# Patient Record
Sex: FEMALE | Race: WHITE | Employment: OTHER | URBAN - METROPOLITAN AREA
[De-identification: names, ages, dates, MRNs, and addresses within clinical notes are randomized per-mention and may not be internally consistent; named-entity substitution may affect disease eponyms.]

---

## 2017-01-05 RX ORDER — TIOTROPIUM BROMIDE 18 UG/1
CAPSULE ORAL; RESPIRATORY (INHALATION)
Qty: 30 CAPSULE | Refills: 2 | Status: SHIPPED | OUTPATIENT
Start: 2017-01-05 | End: 2017-07-17

## 2017-02-03 ENCOUNTER — HOSPITAL ENCOUNTER (INPATIENT)
Facility: HOSPITAL | Age: 82
LOS: 5 days | Discharge: NURSING, REHAB OR LONG-TERM CARE | DRG: 481 | End: 2017-02-09
Attending: EMERGENCY MEDICINE | Admitting: INTERNAL MEDICINE
Payer: MEDICARE

## 2017-02-03 DIAGNOSIS — S72.142A INTERTROCHANTERIC FRACTURE OF LEFT HIP, CLOSED, INITIAL ENCOUNTER (HCC): Primary | ICD-10-CM

## 2017-02-03 LAB
BASOPHILS # BLD AUTO: 0.07 X10(3) UL (ref 0–0.1)
BASOPHILS NFR BLD AUTO: 0.6 %
EOSINOPHIL # BLD AUTO: 0.33 X10(3) UL (ref 0–0.3)
EOSINOPHIL NFR BLD AUTO: 2.6 %
ERYTHROCYTE [DISTWIDTH] IN BLOOD BY AUTOMATED COUNT: 14.6 % (ref 11.5–16)
HCT VFR BLD AUTO: 39.1 % (ref 34–50)
HGB BLD-MCNC: 13.3 G/DL (ref 12–16)
IMMATURE GRANULOCYTE COUNT: 0.04 X10(3) UL (ref 0–1)
IMMATURE GRANULOCYTE RATIO %: 0.3 %
LYMPHOCYTES # BLD AUTO: 1.68 X10(3) UL (ref 0.9–4)
LYMPHOCYTES NFR BLD AUTO: 13.2 %
MCH RBC QN AUTO: 30.3 PG (ref 27–33.2)
MCHC RBC AUTO-ENTMCNC: 34 G/DL (ref 31–37)
MCV RBC AUTO: 89.1 FL (ref 81–100)
MONOCYTES # BLD AUTO: 1.09 X10(3) UL (ref 0.1–0.6)
MONOCYTES NFR BLD AUTO: 8.6 %
NEUTROPHIL ABS PRELIM: 9.5 X10 (3) UL (ref 1.3–6.7)
NEUTROPHILS # BLD AUTO: 9.5 X10(3) UL (ref 1.3–6.7)
NEUTROPHILS NFR BLD AUTO: 74.7 %
PLATELET # BLD AUTO: 271 10(3)UL (ref 150–450)
RBC # BLD AUTO: 4.39 X10(6)UL (ref 3.8–5.1)
RED CELL DISTRIBUTION WIDTH-SD: 47.5 FL (ref 35.1–46.3)
WBC # BLD AUTO: 12.7 X10(3) UL (ref 4–13)

## 2017-02-03 RX ORDER — HYDROMORPHONE HYDROCHLORIDE 1 MG/ML
0.5 INJECTION, SOLUTION INTRAMUSCULAR; INTRAVENOUS; SUBCUTANEOUS EVERY 30 MIN PRN
Status: DISCONTINUED | OUTPATIENT
Start: 2017-02-03 | End: 2017-02-05

## 2017-02-04 ENCOUNTER — APPOINTMENT (OUTPATIENT)
Dept: GENERAL RADIOLOGY | Facility: HOSPITAL | Age: 82
DRG: 481 | End: 2017-02-04
Attending: EMERGENCY MEDICINE
Payer: MEDICARE

## 2017-02-04 ENCOUNTER — ANESTHESIA EVENT (OUTPATIENT)
Dept: SURGERY | Facility: HOSPITAL | Age: 82
DRG: 481 | End: 2017-02-04
Payer: MEDICARE

## 2017-02-04 ENCOUNTER — APPOINTMENT (OUTPATIENT)
Dept: CT IMAGING | Facility: HOSPITAL | Age: 82
DRG: 481 | End: 2017-02-04
Attending: EMERGENCY MEDICINE
Payer: MEDICARE

## 2017-02-04 PROBLEM — R09.02 HYPOXIA: Chronic | Status: ACTIVE | Noted: 2017-02-04

## 2017-02-04 PROBLEM — S72.142A INTERTROCHANTERIC FRACTURE OF LEFT HIP (HCC): Status: ACTIVE | Noted: 2017-02-04

## 2017-02-04 PROBLEM — S72.142A INTERTROCHANTERIC FRACTURE OF LEFT HIP, CLOSED, INITIAL ENCOUNTER (HCC): Status: ACTIVE | Noted: 2017-02-04

## 2017-02-04 LAB
ALBUMIN SERPL-MCNC: 3.4 G/DL (ref 3.5–4.8)
ALLENS TEST: POSITIVE
ALP LIVER SERPL-CCNC: 83 U/L (ref 55–142)
ALT SERPL-CCNC: 24 U/L (ref 14–54)
ANTIBODY SCREEN: NEGATIVE
ARTERIAL BLD GAS O2 SATURATION: 93 % (ref 92–100)
ARTERIAL BLOOD GAS BASE EXCESS: 1.1
ARTERIAL BLOOD GAS HCO3: 26.5 MEQ/L (ref 22–26)
ARTERIAL BLOOD GAS PCO2: 45 MM HG (ref 35–45)
ARTERIAL BLOOD GAS PH: 7.39 (ref 7.35–7.45)
ARTERIAL BLOOD GAS PO2: 67 MM HG (ref 80–105)
AST SERPL-CCNC: 27 U/L (ref 15–41)
ATRIAL RATE: 80 BPM
BASOPHILS # BLD AUTO: 0.07 X10(3) UL (ref 0–0.1)
BASOPHILS NFR BLD AUTO: 0.5 %
BILIRUB SERPL-MCNC: 0.3 MG/DL (ref 0.1–2)
BUN BLD-MCNC: 16 MG/DL (ref 8–20)
BUN BLD-MCNC: 18 MG/DL (ref 8–20)
CALCIUM BLD-MCNC: 8.8 MG/DL (ref 8.3–10.3)
CALCIUM BLD-MCNC: 8.8 MG/DL (ref 8.3–10.3)
CALCULATED O2 SATURATION: 93 % (ref 92–100)
CARBOXYHEMOGLOBIN: 1.5 % SAT (ref 0–3)
CHLORIDE: 97 MMOL/L (ref 101–111)
CHLORIDE: 99 MMOL/L (ref 101–111)
CO2: 26 MMOL/L (ref 22–32)
CO2: 29 MMOL/L (ref 22–32)
CREAT BLD-MCNC: 0.85 MG/DL (ref 0.55–1.02)
CREAT BLD-MCNC: 0.92 MG/DL (ref 0.55–1.02)
EOSINOPHIL # BLD AUTO: 0.02 X10(3) UL (ref 0–0.3)
EOSINOPHIL NFR BLD AUTO: 0.1 %
ERYTHROCYTE [DISTWIDTH] IN BLOOD BY AUTOMATED COUNT: 14.6 % (ref 11.5–16)
GLUCOSE BLD-MCNC: 102 MG/DL (ref 70–99)
GLUCOSE BLD-MCNC: 125 MG/DL (ref 70–99)
HAV IGM SER QL: 2.1 MG/DL (ref 1.7–3)
HCT VFR BLD AUTO: 40.3 % (ref 34–50)
HGB BLD-MCNC: 13.5 G/DL (ref 12–16)
IMMATURE GRANULOCYTE COUNT: 0.06 X10(3) UL (ref 0–1)
IMMATURE GRANULOCYTE RATIO %: 0.4 %
INR BLD: 0.87 (ref 0.89–1.12)
L/M: 4 L/MIN
LYMPHOCYTES # BLD AUTO: 1.03 X10(3) UL (ref 0.9–4)
LYMPHOCYTES NFR BLD AUTO: 7.5 %
M PROTEIN MFR SERPL ELPH: 6.6 G/DL (ref 6.1–8.3)
MCH RBC QN AUTO: 29.9 PG (ref 27–33.2)
MCHC RBC AUTO-ENTMCNC: 33.5 G/DL (ref 31–37)
MCV RBC AUTO: 89.4 FL (ref 81–100)
METHEMOGLOBIN: 0.6 % SAT (ref 0.4–1.5)
MONOCYTES # BLD AUTO: 1.21 X10(3) UL (ref 0.1–0.6)
MONOCYTES NFR BLD AUTO: 8.8 %
NEUTROPHIL ABS PRELIM: 11.36 X10 (3) UL (ref 1.3–6.7)
NEUTROPHILS # BLD AUTO: 11.36 X10(3) UL (ref 1.3–6.7)
NEUTROPHILS NFR BLD AUTO: 82.7 %
P AXIS: 81 DEGREES
P-R INTERVAL: 176 MS
PATIENT TEMPERATURE: 98.6 F
PLATELET # BLD AUTO: 293 10(3)UL (ref 150–450)
POTASSIUM SERPL-SCNC: 4.8 MMOL/L (ref 3.6–5.1)
POTASSIUM SERPL-SCNC: 5.1 MMOL/L (ref 3.6–5.1)
PSA SERPL DL<=0.01 NG/ML-MCNC: 12.1 SECONDS (ref 12.3–14.8)
Q-T INTERVAL: 372 MS
QRS DURATION: 76 MS
QTC CALCULATION (BEZET): 429 MS
R AXIS: 69 DEGREES
RBC # BLD AUTO: 4.51 X10(6)UL (ref 3.8–5.1)
RED CELL DISTRIBUTION WIDTH-SD: 47.7 FL (ref 35.1–46.3)
RH BLOOD TYPE: POSITIVE
SODIUM SERPL-SCNC: 133 MMOL/L (ref 136–144)
SODIUM SERPL-SCNC: 133 MMOL/L (ref 136–144)
T AXIS: 62 DEGREES
TOTAL HEMOGLOBIN: 12.2 G/DL (ref 11.7–16)
TROPONIN: <0.046 NG/ML (ref ?–0.05)
VENTRICULAR RATE: 80 BPM
WBC # BLD AUTO: 13.8 X10(3) UL (ref 4–13)

## 2017-02-04 PROCEDURE — 99223 1ST HOSP IP/OBS HIGH 75: CPT | Performed by: INTERNAL MEDICINE

## 2017-02-04 PROCEDURE — 73502 X-RAY EXAM HIP UNI 2-3 VIEWS: CPT

## 2017-02-04 PROCEDURE — 70450 CT HEAD/BRAIN W/O DYE: CPT

## 2017-02-04 PROCEDURE — 71010 XR CHEST AP PORTABLE  (CPT=71010): CPT

## 2017-02-04 PROCEDURE — 72192 CT PELVIS W/O DYE: CPT

## 2017-02-04 RX ORDER — IPRATROPIUM BROMIDE AND ALBUTEROL SULFATE 2.5; .5 MG/3ML; MG/3ML
3 SOLUTION RESPIRATORY (INHALATION)
Status: DISCONTINUED | OUTPATIENT
Start: 2017-02-04 | End: 2017-02-09

## 2017-02-04 RX ORDER — ENOXAPARIN SODIUM 100 MG/ML
40 INJECTION SUBCUTANEOUS DAILY
Status: DISCONTINUED | OUTPATIENT
Start: 2017-02-04 | End: 2017-02-04

## 2017-02-04 RX ORDER — MONTELUKAST SODIUM 10 MG/1
10 TABLET ORAL NIGHTLY
Status: DISCONTINUED | OUTPATIENT
Start: 2017-02-04 | End: 2017-02-09

## 2017-02-04 RX ORDER — SODIUM CHLORIDE 9 MG/ML
INJECTION, SOLUTION INTRAVENOUS CONTINUOUS
Status: DISCONTINUED | OUTPATIENT
Start: 2017-02-04 | End: 2017-02-08

## 2017-02-04 RX ORDER — HYDROCODONE BITARTRATE AND ACETAMINOPHEN 5; 325 MG/1; MG/1
1 TABLET ORAL EVERY 4 HOURS PRN
Status: DISCONTINUED | OUTPATIENT
Start: 2017-02-04 | End: 2017-02-09

## 2017-02-04 RX ORDER — LOSARTAN POTASSIUM 100 MG/1
100 TABLET ORAL
Status: DISCONTINUED | OUTPATIENT
Start: 2017-02-04 | End: 2017-02-09

## 2017-02-04 RX ORDER — HYDROMORPHONE HYDROCHLORIDE 1 MG/ML
0.4 INJECTION, SOLUTION INTRAMUSCULAR; INTRAVENOUS; SUBCUTANEOUS EVERY 2 HOUR PRN
Status: DISCONTINUED | OUTPATIENT
Start: 2017-02-04 | End: 2017-02-06

## 2017-02-04 RX ORDER — ONDANSETRON 2 MG/ML
INJECTION INTRAMUSCULAR; INTRAVENOUS
Status: COMPLETED
Start: 2017-02-04 | End: 2017-02-04

## 2017-02-04 RX ORDER — DOCUSATE SODIUM 100 MG/1
100 CAPSULE, LIQUID FILLED ORAL 2 TIMES DAILY
Status: DISCONTINUED | OUTPATIENT
Start: 2017-02-04 | End: 2017-02-09

## 2017-02-04 RX ORDER — POLYETHYLENE GLYCOL 3350 17 G/17G
17 POWDER, FOR SOLUTION ORAL DAILY PRN
Status: DISCONTINUED | OUTPATIENT
Start: 2017-02-04 | End: 2017-02-09

## 2017-02-04 RX ORDER — BISACODYL 10 MG
10 SUPPOSITORY, RECTAL RECTAL
Status: DISCONTINUED | OUTPATIENT
Start: 2017-02-04 | End: 2017-02-09

## 2017-02-04 RX ORDER — HYDROMORPHONE HYDROCHLORIDE 1 MG/ML
0.2 INJECTION, SOLUTION INTRAMUSCULAR; INTRAVENOUS; SUBCUTANEOUS EVERY 2 HOUR PRN
Status: DISCONTINUED | OUTPATIENT
Start: 2017-02-04 | End: 2017-02-09

## 2017-02-04 RX ORDER — ENOXAPARIN SODIUM 100 MG/ML
40 INJECTION SUBCUTANEOUS ONCE
Status: COMPLETED | OUTPATIENT
Start: 2017-02-04 | End: 2017-02-04

## 2017-02-04 RX ORDER — SODIUM PHOSPHATE, DIBASIC AND SODIUM PHOSPHATE, MONOBASIC 7; 19 G/133ML; G/133ML
1 ENEMA RECTAL ONCE AS NEEDED
Status: ACTIVE | OUTPATIENT
Start: 2017-02-04 | End: 2017-02-04

## 2017-02-04 RX ORDER — HYDROCODONE BITARTRATE AND ACETAMINOPHEN 5; 325 MG/1; MG/1
2 TABLET ORAL EVERY 4 HOURS PRN
Status: DISCONTINUED | OUTPATIENT
Start: 2017-02-04 | End: 2017-02-09

## 2017-02-04 RX ORDER — METHYLPREDNISOLONE SODIUM SUCCINATE 40 MG/ML
40 INJECTION, POWDER, LYOPHILIZED, FOR SOLUTION INTRAMUSCULAR; INTRAVENOUS EVERY 8 HOURS
Status: DISCONTINUED | OUTPATIENT
Start: 2017-02-04 | End: 2017-02-06

## 2017-02-04 RX ORDER — HYDROMORPHONE HYDROCHLORIDE 1 MG/ML
0.8 INJECTION, SOLUTION INTRAMUSCULAR; INTRAVENOUS; SUBCUTANEOUS EVERY 2 HOUR PRN
Status: DISCONTINUED | OUTPATIENT
Start: 2017-02-04 | End: 2017-02-06

## 2017-02-04 RX ORDER — ACETAMINOPHEN 325 MG/1
650 TABLET ORAL EVERY 4 HOURS PRN
Status: DISCONTINUED | OUTPATIENT
Start: 2017-02-04 | End: 2017-02-09

## 2017-02-04 NOTE — ED PROVIDER NOTES
Patient Seen in: [facility] Emergency Department    History   Patient presents with:  Lower Extremity Injury (musculoskeletal)    Stated Complaint: fall rt hip pain     HPI    44-year-old female fell about 5 hours ago when she got tangled in her she Family History   Problem Relation Age of Onset   • Other[other] [OTHER] Father 79     stroke   • Other[other] [OTHER] Sister      anorexia   • Diabetes Sister    • Heart Disorder Brother 79     AMI   • Cancer Sister      small cell cancer lung         Sm place, and time. She exhibits normal muscle tone. Coordination normal.   Skin: Skin is warm and dry. No rash noted. Psychiatric: She has a normal mood and affect. Her behavior is normal.   Nursing note and vitals reviewed.            ED Course     Labs Re ANTIBODY SCREEN   MRSA/SA SCRN BY PCR:EMERG ORTHO SURG ONLY     CT PELVIS WITHOUT CONTRAST      IMPRESSION:  Nondisplaced though mildly impacted intertrochanteric fracture is noted of the right proximal femur.  No other evidence of acute fracture or dislo encounter diagnosis)    Disposition:  Admit    Follow-up:  No follow-up provider specified.     Medications Prescribed:  Current Discharge Medication List        Present on Admission  Date Reviewed: 1/4/2016          ICD-10-CM Noted POA    Intertrochanteric

## 2017-02-04 NOTE — CONSULTS
[address] [facility] Patient Status:  Inpatient    1929 MRN [id number]   [facility] 3SW-A Attending Liudmila Pantoja DO   Hosp Day # 1 PCP Berenice Pallas, DO   CC:  Right hip pain    History of Prese smokeless tobacco. She reports that she drinks about 8.4 oz of alcohol per week. She reports that she does not use illicit drugs.     Allergies:    Daliresp [Roflumila*    Pain  Scopolamine                 Comment:HBr SOLN             Altered mental state intact skin. Thigh is soft. Knee and ankle are NT. Left lower extremity:  Left hip has intact skin. Thigh is soft. Knee and ankle are NT. Both lower extremities have no calve tenderness. Grossly intact MS exam.  +DP.     Laboratory Data:  Recent Lab

## 2017-02-04 NOTE — PLAN OF CARE
DISCHARGE PLANNING    • Discharge to home or other facility with appropriate resources Progressing        MUSCULOSKELETAL - ADULT    • Return mobility to safest level of function Progressing    • Maintain proper alignment of affected body part Progressing

## 2017-02-04 NOTE — PLAN OF CARE
Spoke with Dr. Amos Patrick regarding pt's order for UA with culture reflux. Pt is incontinent of urine, and has a history of urinary incontinence. Unable to obtain UA with culture reflux per bedpan. UA order cancelled per Dr. Amos Patrick.

## 2017-02-04 NOTE — PHYSICAL THERAPY NOTE
PT consult received. Pt going to OR tomorrow for R hip pinning secondary to fx. Will need new PT orders post-op when appropriate.

## 2017-02-04 NOTE — PLAN OF CARE
Patient remained oriented to person, place, time and situation this shift. Drowsy, but easily arousable. On oxygen 6 liters per ventimask with continuous pulse oximetry monitoring and telemetry monitoring in place.   SCD to LLE, patient refusing SCD to RL

## 2017-02-04 NOTE — H&P
[facility]                                                               History & Physical         Diaz [facility] Patient Status:  Inpatient    1929 MRN [id number]   [facility] 3SW-A Attending Darnell Tejeda MD   [facility] Day reports that she has been smoking Cigarettes. She has been smoking about 0.50 packs per day. She has never used smokeless tobacco. She reports that she drinks about 8.4 oz of alcohol per week. She reports that she does not use illicit drugs.     Allergies: bruits. Respiratory: Clear to auscultation bilaterally. No wheezes. No rhonchi. Cardiovascular: S1, S2.  Regular rate and rhythm. No murmurs. Equal pulses   Abdomen: Soft, nontender, nondistended. Positive bowel sounds.  No rebound tenderness  Neurolog Minimal anterolisthesis of L3 on L4.    Vascular calcifications. Colonic diverticulosis without evidence of diverticulitis. The bladder is unremarkable in appearance. Hysterectomy Osteopenia.      =====  CONCLUSION:     1.  Right intertrochanteric femur fra

## 2017-02-04 NOTE — PROGRESS NOTES
[facility]  Progress Note     Elodia Farooq Patient Status:  Inpatient    1929 MRN [id number]   [facility] 3SW-A Attending Rocio Kincaid, [address] Day # 1 PCP Eve Gupta DO     Chief Complaint: right hip pain    S: ipratropium-albuterol  3 mL Nebulization 4 times per day   • MethylPREDNISolone Sodium Succ  40 mg Intravenous Q8H       ASSESSMENT / PLAN:     1. Right intertrochanteric femoral fracture status post mechanical fall at home  1.  Plan for surgical fixation i

## 2017-02-04 NOTE — OCCUPATIONAL THERAPY NOTE
OT consult received. Pt going to OR tomorrow for R hip pinning secondary to fx. Will need new OT orders post-op when appropriate.

## 2017-02-04 NOTE — CONSULTS
[facility]  Report of Consultation    Ramy Mtz Patient Status:  Inpatient    1929 MRN [id number]   [facility] 3SW-A Attending Ramon Diamond, [address] [facility] Day # 1 PCP Arnoldo Mendez DO     Reason for Consultation:  David Prado smoking about 0.50 packs per day. She has never used smokeless tobacco. She reports that she drinks about 8.4 oz of alcohol per week. She reports that she does not use illicit drugs.     Allergies:    Daliresp [Roflumila*    Pain  Scopolamine nausea vomiting or diarrhea denies any reflux  Musculoskeletal: Denies any lower extremity edema or pain other than related to the fall  Neurological: Mostly intact though some confusion at times     All other review of systems are negative.     Vital signs 02/04/2017   ALB 3.4 02/03/2017   ALKPHO 83 02/03/2017   BILT 0.3 02/03/2017   TP 6.6 02/03/2017   AST 27 02/03/2017   ALT 24 02/03/2017   INR 0.87 02/03/2017   PTP 12.1 02/03/2017   MG 2.1 02/04/2017   TROP <0.046 02/03/2017         Radiology:  Head CT wi

## 2017-02-04 NOTE — PROGRESS NOTES
Please see the anesthesia preanesthetic evaluation. Patient presents for repair of right intertrochanteric femur fracture. Anesthesia risks are elevated related to the patient's COPD requiring home oxygen.    Will consider spinal anesthesia for this proce

## 2017-02-04 NOTE — CM/SW NOTE
02/04/17 1700   CM/SW Referral Data   Referral Source Physician   Reason for Referral Discharge planning   Informant Patient   Pertinent Medical Hx   Primary Care Physician Name dr cheryl Hager Drug/Alcohol Use n   Major Kamala

## 2017-02-04 NOTE — PLAN OF CARE
Patient admitted to 370 via cart from emergency department. Accompanied by transporter. Patient is drowsy, easily arousable, oriented to person, place, time and situation. Oriented to room and safety protocols.   Patient states she is blind due to macula

## 2017-02-04 NOTE — ANESTHESIA PREPROCEDURE EVALUATION
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 ASA: 4   Plan: general and spinal           Comment: Discussed with daughter Zee Alexy present. Patient can sign consent, though asked daughter to sign because patient is legally blind.    Patient's overwhelming anesthesia challenge will b

## 2017-02-05 ENCOUNTER — ANESTHESIA (OUTPATIENT)
Dept: SURGERY | Facility: HOSPITAL | Age: 82
DRG: 481 | End: 2017-02-05
Payer: MEDICARE

## 2017-02-05 ENCOUNTER — APPOINTMENT (OUTPATIENT)
Dept: GENERAL RADIOLOGY | Facility: HOSPITAL | Age: 82
DRG: 481 | End: 2017-02-05
Attending: ORTHOPAEDIC SURGERY
Payer: MEDICARE

## 2017-02-05 ENCOUNTER — APPOINTMENT (OUTPATIENT)
Dept: GENERAL RADIOLOGY | Facility: HOSPITAL | Age: 82
DRG: 481 | End: 2017-02-05
Attending: INTERNAL MEDICINE
Payer: MEDICARE

## 2017-02-05 ENCOUNTER — SURGERY (OUTPATIENT)
Age: 82
End: 2017-02-05

## 2017-02-05 LAB
BASOPHILS # BLD AUTO: 0.01 X10(3) UL (ref 0–0.1)
BASOPHILS NFR BLD AUTO: 0.1 %
BUN BLD-MCNC: 17 MG/DL (ref 8–20)
CALCIUM BLD-MCNC: 8 MG/DL (ref 8.3–10.3)
CHLORIDE: 104 MMOL/L (ref 101–111)
CO2: 26 MMOL/L (ref 22–32)
CREAT BLD-MCNC: 0.7 MG/DL (ref 0.55–1.02)
EOSINOPHIL # BLD AUTO: 0 X10(3) UL (ref 0–0.3)
EOSINOPHIL NFR BLD AUTO: 0 %
ERYTHROCYTE [DISTWIDTH] IN BLOOD BY AUTOMATED COUNT: 14.7 % (ref 11.5–16)
GLUCOSE BLD-MCNC: 123 MG/DL (ref 65–99)
GLUCOSE BLD-MCNC: 154 MG/DL (ref 70–99)
HAV IGM SER QL: 2.2 MG/DL (ref 1.7–3)
HCT VFR BLD AUTO: 36 % (ref 34–50)
HGB BLD-MCNC: 11.8 G/DL (ref 12–16)
IMMATURE GRANULOCYTE COUNT: 0.02 X10(3) UL (ref 0–1)
IMMATURE GRANULOCYTE RATIO %: 0.3 %
LYMPHOCYTES # BLD AUTO: 0.36 X10(3) UL (ref 0.9–4)
LYMPHOCYTES NFR BLD AUTO: 4.8 %
MCH RBC QN AUTO: 29.9 PG (ref 27–33.2)
MCHC RBC AUTO-ENTMCNC: 32.8 G/DL (ref 31–37)
MCV RBC AUTO: 91.1 FL (ref 81–100)
MONOCYTES # BLD AUTO: 0.28 X10(3) UL (ref 0.1–0.6)
MONOCYTES NFR BLD AUTO: 3.8 %
NEUTROPHIL ABS PRELIM: 6.77 X10 (3) UL (ref 1.3–6.7)
NEUTROPHILS # BLD AUTO: 6.77 X10(3) UL (ref 1.3–6.7)
NEUTROPHILS NFR BLD AUTO: 91 %
PHOSPHATE SERPL-MCNC: 2.5 MG/DL (ref 2.5–4.9)
PLATELET # BLD AUTO: 228 10(3)UL (ref 150–450)
POTASSIUM SERPL-SCNC: 4.5 MMOL/L (ref 3.6–5.1)
RBC # BLD AUTO: 3.95 X10(6)UL (ref 3.8–5.1)
RED CELL DISTRIBUTION WIDTH-SD: 49.4 FL (ref 35.1–46.3)
SODIUM SERPL-SCNC: 136 MMOL/L (ref 136–144)
WBC # BLD AUTO: 7.4 X10(3) UL (ref 4–13)

## 2017-02-05 PROCEDURE — 73552 X-RAY EXAM OF FEMUR 2/>: CPT

## 2017-02-05 PROCEDURE — 99232 SBSQ HOSP IP/OBS MODERATE 35: CPT | Performed by: HOSPITALIST

## 2017-02-05 PROCEDURE — 0QS606Z REPOSITION RIGHT UPPER FEMUR WITH INTRAMEDULLARY INTERNAL FIXATION DEVICE, OPEN APPROACH: ICD-10-PCS | Performed by: ORTHOPAEDIC SURGERY

## 2017-02-05 PROCEDURE — 3E0T3CZ INTRODUCTION OF REGIONAL ANESTHETIC INTO PERIPHERAL NERVES AND PLEXI, PERCUTANEOUS APPROACH: ICD-10-PCS | Performed by: ANESTHESIOLOGY

## 2017-02-05 PROCEDURE — 71010 XR CHEST AP PORTABLE  (CPT=71010): CPT

## 2017-02-05 PROCEDURE — 76000 FLUOROSCOPY <1 HR PHYS/QHP: CPT

## 2017-02-05 DEVICE — ZNN CMN LAG SCREW 10.5X95
Type: IMPLANTABLE DEVICE | Site: FEMUR | Status: FUNCTIONAL
Brand: ZIMMER® NATURAL NAIL® SYSTEM

## 2017-02-05 DEVICE — ZNN CMN NAIL 10MMX21.5CM 125R
Type: IMPLANTABLE DEVICE | Site: FEMUR | Status: FUNCTIONAL
Brand: ZIMMER® NATURAL NAIL® SYSTEM

## 2017-02-05 DEVICE — SCREW CORT FA 5.0X35 Z NAIL: Type: IMPLANTABLE DEVICE | Site: FEMUR | Status: FUNCTIONAL

## 2017-02-05 RX ORDER — HYDROMORPHONE HYDROCHLORIDE 1 MG/ML
0.4 INJECTION, SOLUTION INTRAMUSCULAR; INTRAVENOUS; SUBCUTANEOUS EVERY 5 MIN PRN
Status: DISCONTINUED | OUTPATIENT
Start: 2017-02-05 | End: 2017-02-05 | Stop reason: HOSPADM

## 2017-02-05 RX ORDER — DOCUSATE SODIUM 100 MG/1
100 CAPSULE, LIQUID FILLED ORAL 2 TIMES DAILY
Status: DISCONTINUED | OUTPATIENT
Start: 2017-02-05 | End: 2017-02-05

## 2017-02-05 RX ORDER — ENOXAPARIN SODIUM 100 MG/ML
40 INJECTION SUBCUTANEOUS DAILY
Status: DISCONTINUED | OUTPATIENT
Start: 2017-02-06 | End: 2017-02-06

## 2017-02-05 RX ORDER — HYDROCODONE BITARTRATE AND ACETAMINOPHEN 10; 325 MG/1; MG/1
2 TABLET ORAL AS NEEDED
Status: DISCONTINUED | OUTPATIENT
Start: 2017-02-05 | End: 2017-02-05 | Stop reason: HOSPADM

## 2017-02-05 RX ORDER — FAMOTIDINE 20 MG/1
20 TABLET ORAL DAILY
Status: DISCONTINUED | OUTPATIENT
Start: 2017-02-05 | End: 2017-02-09

## 2017-02-05 RX ORDER — SODIUM CHLORIDE, SODIUM LACTATE, POTASSIUM CHLORIDE, CALCIUM CHLORIDE 600; 310; 30; 20 MG/100ML; MG/100ML; MG/100ML; MG/100ML
INJECTION, SOLUTION INTRAVENOUS CONTINUOUS
Status: DISCONTINUED | OUTPATIENT
Start: 2017-02-05 | End: 2017-02-06

## 2017-02-05 RX ORDER — ACETAMINOPHEN 325 MG/1
650 TABLET ORAL ONCE
Status: DISCONTINUED | OUTPATIENT
Start: 2017-02-05 | End: 2017-02-05 | Stop reason: HOSPADM

## 2017-02-05 RX ORDER — MORPHINE SULFATE 2 MG/ML
2 INJECTION, SOLUTION INTRAMUSCULAR; INTRAVENOUS EVERY 2 HOUR PRN
Status: DISCONTINUED | OUTPATIENT
Start: 2017-02-05 | End: 2017-02-09

## 2017-02-05 RX ORDER — ACETAMINOPHEN 500 MG
1000 TABLET ORAL ONCE AS NEEDED
Status: DISCONTINUED | OUTPATIENT
Start: 2017-02-05 | End: 2017-02-05 | Stop reason: HOSPADM

## 2017-02-05 RX ORDER — POLYETHYLENE GLYCOL 3350 17 G/17G
1 POWDER, FOR SOLUTION ORAL DAILY PRN
Status: DISCONTINUED | OUTPATIENT
Start: 2017-02-05 | End: 2017-02-09

## 2017-02-05 RX ORDER — ONDANSETRON 2 MG/ML
4 INJECTION INTRAMUSCULAR; INTRAVENOUS AS NEEDED
Status: DISCONTINUED | OUTPATIENT
Start: 2017-02-05 | End: 2017-02-05 | Stop reason: HOSPADM

## 2017-02-05 RX ORDER — HYDROCODONE BITARTRATE AND ACETAMINOPHEN 10; 325 MG/1; MG/1
1 TABLET ORAL AS NEEDED
Status: DISCONTINUED | OUTPATIENT
Start: 2017-02-05 | End: 2017-02-05 | Stop reason: HOSPADM

## 2017-02-05 RX ORDER — DEXTROSE MONOHYDRATE 25 G/50ML
50 INJECTION, SOLUTION INTRAVENOUS
Status: DISCONTINUED | OUTPATIENT
Start: 2017-02-05 | End: 2017-02-05 | Stop reason: HOSPADM

## 2017-02-05 RX ORDER — ONDANSETRON 2 MG/ML
4 INJECTION INTRAMUSCULAR; INTRAVENOUS EVERY 6 HOURS PRN
Status: DISCONTINUED | OUTPATIENT
Start: 2017-02-05 | End: 2017-02-09

## 2017-02-05 RX ORDER — CEFAZOLIN SODIUM 1 G/3ML
INJECTION, POWDER, FOR SOLUTION INTRAMUSCULAR; INTRAVENOUS
Status: DISCONTINUED | OUTPATIENT
Start: 2017-02-05 | End: 2017-02-05 | Stop reason: HOSPADM

## 2017-02-05 RX ORDER — BISACODYL 10 MG
10 SUPPOSITORY, RECTAL RECTAL
Status: DISCONTINUED | OUTPATIENT
Start: 2017-02-05 | End: 2017-02-09

## 2017-02-05 RX ORDER — TEMAZEPAM 15 MG/1
15 CAPSULE ORAL NIGHTLY PRN
Status: DISCONTINUED | OUTPATIENT
Start: 2017-02-05 | End: 2017-02-06

## 2017-02-05 RX ORDER — NALOXONE HYDROCHLORIDE 0.4 MG/ML
80 INJECTION, SOLUTION INTRAMUSCULAR; INTRAVENOUS; SUBCUTANEOUS AS NEEDED
Status: DISCONTINUED | OUTPATIENT
Start: 2017-02-05 | End: 2017-02-05 | Stop reason: HOSPADM

## 2017-02-05 RX ORDER — FAMOTIDINE 10 MG/ML
20 INJECTION, SOLUTION INTRAVENOUS DAILY
Status: DISCONTINUED | OUTPATIENT
Start: 2017-02-05 | End: 2017-02-06

## 2017-02-05 NOTE — PLAN OF CARE
This RN spoke with Dr. Tracee Iverson regarding patient and Dr. Tracee Iverson stated it was ok to send Taniya Harris to surgery this am.  Anesthesia to determine if patient needs to go to ICU for respiratory status post operatively.

## 2017-02-05 NOTE — PROGRESS NOTES
[facility]  Progress Note    Jonathan Lucio Patient Status:  Inpatient    1929 MRN [id number]   [facility] 3SW-A Attending Adrien Watson DO   Hosp Day # 2 PCP Ayden Quintanilla DO     Subjective:  Jonathan Lucio is a(n) 80 hypertension     Seborrheic dermatitis of scalp     Pulmonary hypertension (HCC)     Intertrochanteric fracture of left hip (HCC)     Intertrochanteric fracture of left hip, closed, initial encounter     Hypoxia     Hyponatremia    Impression    1.  Right

## 2017-02-05 NOTE — ANESTHESIA POSTPROCEDURE EVALUATION
[address] [facility] Patient Status:  Inpatient   Age/Gender 80year old female MRN [id number]   [facility] SURGERY Attending Leesa Mcnair, [address] [facility] Day # 2 PCP Mj Morgan DO       Anesthesia Post-op Note    Procedure(s

## 2017-02-05 NOTE — PROGRESS NOTES
[facility] Pharmacy Note:  Renal Dose Adjustment    Quan Mendieta has been prescribed famotidine (PEPCID) 20 mg intravenously /po every 12 hours. Estimated Creatinine Clearance: 40.6 mL/min (based on Cr of 0.7).     Her calculated creatinine clearance is <5

## 2017-02-05 NOTE — OPERATIVE REPORT
PRE-OP DX:  RIGHT INTERTROCHANTERIC HIP FRACTURE  POST-OP DX:  SAME  PROCEDURE:RGHT FEMUR INTRAMEDULLARY FIXATION  SURGEON:  Aarti Nickerson MD  FIRST ASSIST: NONE  ANESTHESIA:  SPINAL  FEM:20BA  COMPLICATIONS:  NONE  SPECIMEN:  NONE  DRAIN: NONE   IMPLANT USE SUBCUTANEOUS LAYER WAS CLOSED. SKIN WAS CLOSED WITH STAPLES. DRESSING WAS APPLIED. ALL COUNTS WERE CORRECT.     Yuliya Quiroz MD

## 2017-02-05 NOTE — PROGRESS NOTES
[facility]ITAL[facility]  Progress Note     Enrique Lezama Patient Status:  Inpatient    1929 MRN [id number]   [facility] 3SW-A Attending Lazarus Handy, [address] [facility] Day # 2 PCP Serena Bucio DO     Chief Complaint: right hip pain    S: • famoTIDine  20 mg Intravenous Daily   • ceFAZolin  2 g Intravenous Q8H   • Fluticasone Furoate-Vilanterol  1 puff Inhalation Daily   • losartan  100 mg Oral Daily   • Montelukast Sodium  10 mg Oral Nightly   • Umeclidinium Bromide  1 puff Inhalation Da

## 2017-02-05 NOTE — PLAN OF CARE
Patient remained alert and oriented to person, place, time and situation this shift. On oxygen 4-5 liters per high pressure nasal cannula with continuous pulse oximetry monitoring and oxygen saturations remaining greater than 92%.   Patient is blind but ab

## 2017-02-06 LAB
ERYTHROCYTE [DISTWIDTH] IN BLOOD BY AUTOMATED COUNT: 15.1 % (ref 11.5–16)
HCT VFR BLD AUTO: 31.9 % (ref 34–50)
HGB BLD-MCNC: 10.5 G/DL (ref 12–16)
MCH RBC QN AUTO: 30.3 PG (ref 27–33.2)
MCHC RBC AUTO-ENTMCNC: 32.9 G/DL (ref 31–37)
MCV RBC AUTO: 92.2 FL (ref 81–100)
PLATELET # BLD AUTO: 225 10(3)UL (ref 150–450)
RBC # BLD AUTO: 3.46 X10(6)UL (ref 3.8–5.1)
RED CELL DISTRIBUTION WIDTH-SD: 51.4 FL (ref 35.1–46.3)
WBC # BLD AUTO: 12 X10(3) UL (ref 4–13)

## 2017-02-06 PROCEDURE — 99232 SBSQ HOSP IP/OBS MODERATE 35: CPT | Performed by: HOSPITALIST

## 2017-02-06 RX ORDER — METHYLPREDNISOLONE SODIUM SUCCINATE 40 MG/ML
40 INJECTION, POWDER, LYOPHILIZED, FOR SOLUTION INTRAMUSCULAR; INTRAVENOUS EVERY 8 HOURS
Status: COMPLETED | OUTPATIENT
Start: 2017-02-06 | End: 2017-02-06

## 2017-02-06 RX ORDER — PREDNISONE 20 MG/1
40 TABLET ORAL
Status: DISCONTINUED | OUTPATIENT
Start: 2017-02-07 | End: 2017-02-09

## 2017-02-06 NOTE — PROGRESS NOTES
Orthopedic surgery progress note    Kulwantherminia Bautista Patient Status:  Inpatient    1929 MRN [id number]   [facility] 3SW-A Attending Leesa Mcnair, 160[address] [facility] Day # 3 PCP Mj Morgan DO       Subjective:  No major complaints.   No

## 2017-02-06 NOTE — PROGRESS NOTES
[facility]ITAL[facility]  Progress Note     Heather Peña Patient Status:  Inpatient    1929 MRN [id number]   [facility] 3SW-A Attending Yesi Kang, [address] [facility] Day # 3 PCP Carlo Gee DO     Chief Complaint: right hip pain    S: Q8H   • [START ON 2/7/2017] predniSONE  40 mg Oral Daily with breakfast   • enoxaparin  40 mg Subcutaneous Daily   • famoTIDine  20 mg Oral Daily    Or   • famoTIDine  20 mg Intravenous Daily   • Fluticasone Furoate-Vilanterol  1 puff Inhalation Daily   •

## 2017-02-06 NOTE — CM/SW NOTE
Informed by PT/OT that NAMAN is recommended. Pt indicating earlier that she wanted to go home but now is aware of need for NAMAN. MELANIE met with pt and son. Discussed NAMAN list and encouraged son to tour. MELANIE noted [facility] partnered SARs.   Pt/son to call MELANIE with q

## 2017-02-06 NOTE — PHYSICAL THERAPY NOTE
PHYSICAL THERAPY EVALUATION - INPATIENT     Room Number: 370/370-A  Evaluation Date: 2/6/2017  Type of Evaluation: Initial  Physician Order: PT Eval and Treat    Presenting Problem: Intertrochantaric hip fracture  Reason for Therapy: Mobility Dysfuncti risk    WEIGHT BEARING RESTRICTION  Weight Bearing Restriction: None                PAIN ASSESSMENT  Ratin          COGNITION  · Following Commands:  follows one step commands consistently  · Motor Planning: intact  · Problem Solving:  assistance requi motivated. Patient was easily aroused and required cueing to remain in supine while bed and lines were adjusted for patient's safety. Patient completed bed mobility with CGA and completed sit to stand with MOD-A.  Patient was able to stand using a rolling conservation;Patient education; Family education;Gait training;Strengthening;Transfer training;Balance training  Rehab Potential : Good  Frequency (Obs): 5x/week  Number of Visits to Meet Established Goals: 5        CURRENT GOALS    Goal #1 Patient is able

## 2017-02-06 NOTE — PROGRESS NOTES
Post Op Day 1 Ortho Note    Status Post Nerve Block:  Type of Nerve Block: Right Fascia Iliaca  Single Injection Nerve Block    Post op review: No evidence of immediate block related complications, No paresthesia noted, Able to plantar flex foot, Able to d

## 2017-02-06 NOTE — OCCUPATIONAL THERAPY NOTE
OCCUPATIONAL THERAPY EVALUATION - INPATIENT     Room Number: 370/370-A  Evaluation Date: 2/6/2017  Type of Evaluation: Initial  Presenting Problem: fall, R intertrochenteric femur fracture, 2/5 s/p R femur intramedullary fixation    Physician Order: G[place]: modified independent with ADL, including showering. Pt's sons work during the day. They cook, drive, and shop. Pt has been able to ambulate without device at home.       SUBJECTIVE  \"I am strong, but I notice that my legs are trembling now Impairment: 66.57%  Standardized Score (AM-PAC Scale): 30.6  CMS Modifier (G-Code): CL    FUNCTIONAL TRANSFER ASSESSMENT  Supine to Sit : Maximum assistance  Sit to Stand: Dependent assistance (max A x 2)    Skilled Therapy Provided: Pt is highly motivated inpatient OT to address the above deficits, maximizing patient's ability to return to prior level of function. Subacute rehab is recommended for 20 to 22 days. After this period of rehabilitation patient should achieve supervision level in ADL.  Informed

## 2017-02-06 NOTE — PROGRESS NOTES
[facility]  Progress Note    Manda Schwab Patient Status:  Inpatient    1929 MRN [id number]   [facility] 3SW-A Attending Nazario Wall DO   Hosp Day # 3 PCP Donald Rashid DO     Impression    1.  Right anterior trocha distress  Heart: Regular rate and rhythm, normal S1S2  Lungs:  diminsine d   Abdomen: soft, nontender, no guarding, no rebound, positive BS  Extremity: no lower extremity edema, no cyanosis  Neurological:, no new focal deficits    Lab Data Review:    Recen infusion  Intravenous Continuous   Fluticasone Furoate-Vilanterol (BREO ELLIPTA) 200-25 MCG/INH inhaler 1 puff 1 puff Inhalation Daily   losartan (COZAAR) tab 100 mg 100 mg Oral Daily   Montelukast Sodium (SINGULAIR) tab 10 mg 10 mg Oral Nightly   Umeclidi

## 2017-02-06 NOTE — PROGRESS NOTES
Patient sitting upright in bed, O2 high flow in place, non productive cough, IVF infusing, awaiting PT evalution.  Per patient she is reluctant to consider Stefan, would consider 8 hour nursing care at home, has 3 adult family members who work and them would b

## 2017-02-06 NOTE — PLAN OF CARE
Patient remained alert and oriented to person, place, time and situation this shift. On oxygen 4-5 liters per nasal cannula with continuous pulse oximetry monitoring and oxygen saturations remaining greater than 92% this shift.   Administered ancef as orde

## 2017-02-07 LAB
ERYTHROCYTE [DISTWIDTH] IN BLOOD BY AUTOMATED COUNT: 15 % (ref 11.5–16)
HCT VFR BLD AUTO: 33.1 % (ref 34–50)
HGB BLD-MCNC: 11 G/DL (ref 12–16)
MCH RBC QN AUTO: 30.2 PG (ref 27–33.2)
MCHC RBC AUTO-ENTMCNC: 33.2 G/DL (ref 31–37)
MCV RBC AUTO: 90.9 FL (ref 81–100)
PLATELET # BLD AUTO: 247 10(3)UL (ref 150–450)
RBC # BLD AUTO: 3.64 X10(6)UL (ref 3.8–5.1)
RED CELL DISTRIBUTION WIDTH-SD: 50.3 FL (ref 35.1–46.3)
WBC # BLD AUTO: 10.3 X10(3) UL (ref 4–13)

## 2017-02-07 PROCEDURE — 99232 SBSQ HOSP IP/OBS MODERATE 35: CPT | Performed by: HOSPITALIST

## 2017-02-07 RX ORDER — HYDROCODONE BITARTRATE AND ACETAMINOPHEN 5; 325 MG/1; MG/1
1 TABLET ORAL EVERY 4 HOURS PRN
Qty: 60 TABLET | Refills: 0 | Status: ON HOLD | OUTPATIENT
Start: 2017-02-07 | End: 2017-09-05

## 2017-02-07 NOTE — PLAN OF CARE
DISCHARGE PLANNING    • Discharge to home or other facility with appropriate resources Progressing        Impaired Activities of Daily Living    • Achieve highest/safest level of independence in self care Progressing        Impaired Functional Mobility

## 2017-02-07 NOTE — PHYSICAL THERAPY NOTE
PHYSICAL THERAPY HIP TREATMENT NOTE - INPATIENT      Room Number: 370/370-A     Session: 1   Number of Visits to Meet Established Goals: 5    Presenting Problem: Intertrochantaric hip fracture    Problem List  Principal Problem:    Intertrochanteric fractu bed?: A Little   How much help from another person does the patient currently need. ..   -   Moving to and from a bed to a chair (including a wheelchair)?: A Little   -   Need to walk in hospital room?: A Little   -   Climbing 3-5 steps with a railing?: A L gait/transfers resulting in downgrade of overall functional mobility. Due to above deficits, Pt will benefit from continued IP PT, so that patient may achieve highest functional independence/return to baseline.  Recommend NAMAN upon EH d/c.        DISCHARGE

## 2017-02-07 NOTE — PROGRESS NOTES
[facility]PITAL[facility]  Progress Note     Adolphpavel Mtz Patient Status:  Inpatient    1929 MRN [id number]   [facility] 3SW-A Attending Ramon Diamond, [address] [facility] Day # 4 PCP Arnoldo Mendez DO     Chief Complaint: right hip pain    S: ipratropium-albuterol  3 mL Nebulization 4 times per day       ASSESSMENT / PLAN:     1. Right intertrochanteric femoral fracture secondary to mechanical fall s/p intramedullary honey  1. Ortho following  2. PT following  3. Continue pain control  2.  COPD wi

## 2017-02-07 NOTE — CM/SW NOTE
MELANIE spoke with dtr, Marjorie Sue, again re: NAMAN referrals. She requests referral to [facility] [address] at [facility], [facility]. as well. MELANIE noted that [facility] reviewing and does have bed availability.   Shahram Trace- message left for Jolene Martini in admission (dtr spoke with Flo), and P

## 2017-02-07 NOTE — PROGRESS NOTES
[facility]  Progress Note    Suresh العراقي Patient Status:  Inpatient    1929 MRN [id number]   [facility] 3SW-A Attending Mervat Prabhakar DO   Hosp Day # 4 PCP Benita Christiansen DO     Impression    1.  Right anterior trocha kg)  01/04/16 : 111 lb (50.349 kg)  11/19/15 : 110 lb (49.896 kg)  08/20/15 : 109 lb 14.4 oz (49.85 kg)  05/07/15 : 112 lb (50.803 kg)      Physical Exam:  General: alert, oriented, no apparent distress  Heart: Regular rate and rhythm, normal S1S2  Lungs: Bromide (INCRUSE ELLIPTA) 62.5 MCG/INH inhaler 1 puff 1 puff Inhalation Daily   0.9%  NaCl infusion  Intravenous Continuous   acetaminophen (TYLENOL) tab 650 mg 650 mg Oral Q4H PRN   Or      HYDROcodone-acetaminophen (NORCO) 5-325 MG per tab 1 tablet 1 tab

## 2017-02-07 NOTE — OCCUPATIONAL THERAPY NOTE
OCCUPATIONAL THERAPY TREATMENT NOTE - INPATIENT     Room Number: 370/370-A  Session: 1   Number of Visits to Meet Established Goals: 5    Presenting Problem: fall, R intertrochenteric femur fracture, 2/5 s/p R femur intramedullary fixation    History relat Inpatient Daily Activity Short Form  How much help from another person does the patient currently need…  -   Putting on and taking off regular lower body clothing?: A Lot  -   Bathing (including washing, rinsing, drying)?: A Lot  -   Toileting, which inclu would benefit from skilled inpatient OT to address the above deficits, maximizing patient's ability to return to prior level of function. Subacute rehab is recommended for 20 to 22 days.   After this period of rehabilitation patient should achieve supervis

## 2017-02-07 NOTE — CM/SW NOTE
SW received call from pt's dtr, Doris Ortega [phone]. She notes that she or her sister, Cosme Riojas, are the main contacts. She adds that they have 6 brothers.   Dtr states that they have 3 SARs that are being considered- [employer], [address]

## 2017-02-07 NOTE — PLAN OF CARE
Found pt attempting to get out of bed , disoriented to time, situation and place . After trying to explain the situation , pt agreed to get back in bed with minimal assist . Will continue to monitor .

## 2017-02-08 LAB
ERYTHROCYTE [DISTWIDTH] IN BLOOD BY AUTOMATED COUNT: 14.6 % (ref 11.5–16)
HCT VFR BLD AUTO: 32.1 % (ref 34–50)
HGB BLD-MCNC: 10.7 G/DL (ref 12–16)
MCH RBC QN AUTO: 29.8 PG (ref 27–33.2)
MCHC RBC AUTO-ENTMCNC: 33.3 G/DL (ref 31–37)
MCV RBC AUTO: 89.4 FL (ref 81–100)
PLATELET # BLD AUTO: 269 10(3)UL (ref 150–450)
RBC # BLD AUTO: 3.59 X10(6)UL (ref 3.8–5.1)
RED CELL DISTRIBUTION WIDTH-SD: 47.9 FL (ref 35.1–46.3)
WBC # BLD AUTO: 8.9 X10(3) UL (ref 4–13)

## 2017-02-08 PROCEDURE — 99232 SBSQ HOSP IP/OBS MODERATE 35: CPT | Performed by: HOSPITALIST

## 2017-02-08 NOTE — PHYSICAL THERAPY NOTE
PHYSICAL THERAPY HIP TREATMENT NOTE - INPATIENT      Room Number: 370/370-A     Session:  2  Number of Visits to Meet Established Goals: 5    Presenting Problem: Intertrochantaric hip fracture    Problem List  Principal Problem:    Intertrochanteric fractu commode, etc.): A Lot   -   Moving from lying on back to sitting on the side of the bed?: A Little   How much help from another person does the patient currently need. ..   -   Moving to and from a bed to a chair (including a wheelchair)?: A Little   -   Ne to advance gait distance, and motivated by session progress. At this time, Pt. presents with decreased balance, impaired strength, difficulty with gait/transfers resulting in downgrade of overall functional mobility.   Due to above deficits, Pt will benefi

## 2017-02-08 NOTE — PROGRESS NOTES
[facility]  Progress Note     Ceasar Divide Patient Status:  Inpatient    1929 MRN [id number]   [facility] 3SW-A Attending James Hernandez MD   [facility] Day # 5 PCP Dale Munson DO     Chief Complaint: right hip pain    S: times per day       ASSESSMENT / PLAN:     1. Right intertrochanteric femoral fracture secondary to mechanical fall s/p intramedullary honey  1. Ortho following  2. PT following  3. Continue pain control  2.  Acute COPD exacerbation with chronic hypoxic respi

## 2017-02-08 NOTE — PROGRESS NOTES
Called and spoke with pulmonology APN this afternoon.  Does not see any reason that patient cannot be released to rehab if bed becomes available, however, recommends waiting for final decision by pulmonologist when rounding today prior to making transfer  a

## 2017-02-08 NOTE — CM/SW NOTE
Spoke with daughter Alton Moreland via phone, currently touring [facility] facilities, no final decision as of yet. Instructed to call SW/CM as soon as a facility is chosen as patient may be ready for discharge today or tomorrow.     Flaquito Villalobos RN,   [phone]

## 2017-02-08 NOTE — CM/SW NOTE
TC from patient's daughter Jose Tanner. They have chosen The [address] [place] [address] at [facility]. ECIN checked and referral in place. Tentative d/c planned for tomorrow.

## 2017-02-08 NOTE — PROGRESS NOTES
[facility]  Progress Note    Loraine Napoles Patient Status:  Inpatient    1929 MRN [id number]   [facility] 3SW-A Attending Mya Santos MD   [address] Day # 5 PCP Mona Wick DO     Subjective:    1. Findings suggestive of emphysema/COPD. 2. Prior suggested right upper lobe pulmonary nodule is not discretely seen on the current study. 3. No acute disease is discretely noted.     Medications Reviewed:    Current Facility-Administered Medications: ipratropium-albuterol (DUONEB) nebulizer solution 3 mL 3 mL Nebulization 4 times per day       Assessment and Plan:  Patient Active Problem List:     COPD [facility]chronic [facility] disease) [facility]     Legally blind     DM (diabetes mellitus) type II

## 2017-02-08 NOTE — OCCUPATIONAL THERAPY NOTE
OCCUPATIONAL THERAPY TREATMENT NOTE - INPATIENT     Room Number: 370/370-A  Session: 2   Number of Visits to Meet Established Goals: 5    Presenting Problem: fall, R intertrochenteric femur fracture, 2/5 s/p R femur intramedullary fixation    History relat Inpatient Daily Activity Short Form  How much help from another person does the patient currently need…  -   Putting on and taking off regular lower body clothing?: A Little  -   Bathing (including washing, rinsing, drying)?: A Lot  -   Toileting, which in degeneration. The patient is below baseline and would benefit from skilled inpatient OT to address the above deficits, maximizing patient's ability to return to prior level of function. Subacute rehab is recommended for 20 to 22 days.   After this period o

## 2017-02-09 VITALS
TEMPERATURE: 98 F | RESPIRATION RATE: 18 BRPM | HEIGHT: 60 IN | SYSTOLIC BLOOD PRESSURE: 118 MMHG | OXYGEN SATURATION: 94 % | WEIGHT: 100 LBS | DIASTOLIC BLOOD PRESSURE: 56 MMHG | BODY MASS INDEX: 19.63 KG/M2 | HEART RATE: 68 BPM

## 2017-02-09 PROCEDURE — 99239 HOSP IP/OBS DSCHRG MGMT >30: CPT | Performed by: HOSPITALIST

## 2017-02-09 RX ORDER — PREDNISONE 10 MG/1
TABLET ORAL
Qty: 20 TABLET | Refills: 0 | Status: ON HOLD | OUTPATIENT
Start: 2017-02-09 | End: 2017-09-05 | Stop reason: CLARIF

## 2017-02-09 NOTE — PHYSICAL THERAPY NOTE
PHYSICAL THERAPY TREATMENT NOTE - INPATIENT    Room Number: 370/370-A     Session: 3   Number of Visits to Meet Established Goals: 5    Presenting Problem: Intertrochantaric hip fracture    Problem List  Principal Problem:    Intertrochanteric fracture of in bed (including adjusting bedclothes, sheets and blankets)?: A Little   -   Sitting down on and standing up from a chair with arms (e.g., wheelchair, bedside commode, etc.): A Little   -   Moving from lying on back to sitting on the side of the bed?: A L impaired strength, difficulty with gait/transfers resulting in downgrade of overall functional mobility.  Due to above deficits, Pt will benefit from continued IP PT, so that patient may achieve highest functional independence/return to baseline.  Recommend

## 2017-02-09 NOTE — PLAN OF CARE
DISCHARGE PLANNING    • Discharge to home or other facility with appropriate resources Adequate for Discharge        Impaired Activities of Daily Living    • Achieve highest/safest level of independence in self care Adequate for Discharge        Impaired F

## 2017-02-09 NOTE — OCCUPATIONAL THERAPY NOTE
Attempted to see the patient for OT session. Pt is waiting for transport to [facility].

## 2017-02-09 NOTE — PROGRESS NOTES
[facility]  Progress Note    Yamileth Awad Patient Status:  Inpatient    1929 MRN [id number]   [facility] 3SW-A Attending Pilar Wong MD   [address] Day # 6 PCP Raford Cogan, DO     Subjective:  Yamileth Awad is a(n) 80 Intertrochanteric fracture of left hip (HCC)     Intertrochanteric fracture of left hip, closed, initial encounter     Hypoxia     Hyponatremia    Assessment:      · Right Intertrochanteric Fem Fx 2/2 Mechanical Fall s/p Intramedullary Barrie  · O2 Dependent

## 2017-02-09 NOTE — CM/SW NOTE
02/09/17 1700   Discharge disposition   Discharged to: Skilled Nurs   Name of [address] [place]   Patient assessed for rehabilitation services?  Yes   Patient is Discharged to a [address] [place], [place] [address]vard Yes   Discharge transportatio

## 2017-02-10 ENCOUNTER — SNF ADMIT/H&P (OUTPATIENT)
Dept: INTERNAL MEDICINE CLINIC | Facility: CLINIC | Age: 82
End: 2017-02-10

## 2017-02-10 DIAGNOSIS — S72.142A INTERTROCHANTERIC FRACTURE OF LEFT HIP, CLOSED, INITIAL ENCOUNTER (HCC): Primary | ICD-10-CM

## 2017-02-10 DIAGNOSIS — J44.9 COPD, SEVERE (HCC): ICD-10-CM

## 2017-02-10 DIAGNOSIS — I10 ESSENTIAL HYPERTENSION: ICD-10-CM

## 2017-02-10 DIAGNOSIS — C85.90 LYMPHOMA IN REMISSION (HCC): ICD-10-CM

## 2017-02-10 PROCEDURE — 99306 1ST NF CARE HIGH MDM 50: CPT | Performed by: INTERNAL MEDICINE

## 2017-02-10 NOTE — PROGRESS NOTES
Patient was discharged from [facility] to the [facility] yesterday. The patient was admitted with a fall with a intertrochanteric fracture of the left hip. She ultimately underwent surgery on February 5 per Dr. Spencer Luz   For her physical therapy notes from bowel sounds  Extremities: No cyanosis, she has trace edema noted in the operative leg. Peripheral pulses were palpable. Neuro: Grossly nonfocal and intact    Impression: 1. Right hip fracture, 2. COPD, 3. Hypertension, 4.   Tobacco abuse    Plan: Confidex

## 2017-02-10 NOTE — DISCHARGE SUMMARY
[facility]PITAL[facility]  DISCHARGE SUMMARY     Yamileth Awad Patient Status:  Inpatient    1929 MRN [id number]   [facility] 3SW-A Attending No att. providers found   Hosp Day # 6 PCP Raford Cogan, DO     Date of Admission: 2/3/2017 focal weakness or numbness. Brief Synopsis:     The patient was diagnosed with a right intertrochanteric femoral fracture. This is secondary to mechanical fall. She was seen by orthopedic surgery. On 2/5/2017 she had a intertrochanteric nail placed. rinsing as needed    Quantity:  118 mL   Refills:  1       DULERA 100-5 MCG/ACT Aero   Generic drug:  Mometasone Furo-Formoterol Fum        Inhale 1 puff into the lungs 2 (two) times daily.     Refills:  9       losartan 100 MG Tabs   Last time this was giv for a visit in 2 weeks  orthopedics    MD Kalyan Yusuf [address] [facility] Jordontaradha [phone]      Pulmonologistkatherine after dc rehab      Vital signs:  Temp:  [98 °F (36.7 °C)-98.5 °F (36.9 °C)] 98 °F (36.7 °C)  Pulse:  [76-18

## 2017-02-13 ENCOUNTER — SNF VISIT (OUTPATIENT)
Dept: INTERNAL MEDICINE CLINIC | Facility: CLINIC | Age: 82
End: 2017-02-13

## 2017-02-13 DIAGNOSIS — S72.001D CLOSED RIGHT HIP FRACTURE, WITH ROUTINE HEALING, SUBSEQUENT ENCOUNTER: Primary | ICD-10-CM

## 2017-02-13 DIAGNOSIS — I10 ESSENTIAL HYPERTENSION: ICD-10-CM

## 2017-02-13 DIAGNOSIS — C85.90 LYMPHOMA IN REMISSION (HCC): ICD-10-CM

## 2017-02-13 DIAGNOSIS — J44.9 COPD, SEVERE (HCC): ICD-10-CM

## 2017-02-13 PROCEDURE — 99307 SBSQ NF CARE SF MDM 10: CPT | Performed by: INTERNAL MEDICINE

## 2017-02-13 NOTE — PROGRESS NOTES
Patient was seen in follow-up for her recent right hip fracture, COPD, hypertension, and lymphoma in remission. She was in her room without any complaints of chest pain, shortness of breath, nausea or vomiting.   She is tolerating the nicotine patch at 14

## 2017-02-15 ENCOUNTER — SNF VISIT (OUTPATIENT)
Dept: INTERNAL MEDICINE CLINIC | Facility: CLINIC | Age: 82
End: 2017-02-15

## 2017-02-15 DIAGNOSIS — I10 ESSENTIAL HYPERTENSION: ICD-10-CM

## 2017-02-15 DIAGNOSIS — S72.001D CLOSED RIGHT HIP FRACTURE, WITH ROUTINE HEALING, SUBSEQUENT ENCOUNTER: Primary | ICD-10-CM

## 2017-02-15 DIAGNOSIS — J44.9 COPD, SEVERE (HCC): ICD-10-CM

## 2017-02-15 PROCEDURE — 99307 SBSQ NF CARE SF MDM 10: CPT | Performed by: INTERNAL MEDICINE

## 2017-02-20 ENCOUNTER — SNF VISIT (OUTPATIENT)
Dept: INTERNAL MEDICINE CLINIC | Facility: CLINIC | Age: 82
End: 2017-02-20

## 2017-02-20 DIAGNOSIS — I10 ESSENTIAL HYPERTENSION: ICD-10-CM

## 2017-02-20 DIAGNOSIS — R50.9 FEVER, UNSPECIFIED FEVER CAUSE: Primary | ICD-10-CM

## 2017-02-20 DIAGNOSIS — J44.9 COPD, SEVERE (HCC): ICD-10-CM

## 2017-02-20 DIAGNOSIS — S72.001D CLOSED RIGHT HIP FRACTURE, WITH ROUTINE HEALING, SUBSEQUENT ENCOUNTER: ICD-10-CM

## 2017-02-20 PROCEDURE — 99308 SBSQ NF CARE LOW MDM 20: CPT | Performed by: INTERNAL MEDICINE

## 2017-02-20 NOTE — PROGRESS NOTES
Patient was seen today in follow-up for her recent right hip fracture, COPD and hypertension. According to nursing the patient is running a low-grade fever and she has crackles in her left lung base.   Patient was currently sitting in a room in her wheelch

## 2017-02-20 NOTE — PROGRESS NOTES
Patient was seen in follow-up for her recent hip fracture, hypertension, and COPD. She denied any chest pain, shortness of breath, nausea or vomiting. No difficulty with her bowels or bladder. She has a tentative discharge date set at February 21.     Ob

## 2017-03-01 ENCOUNTER — TELEPHONE (OUTPATIENT)
Dept: FAMILY MEDICINE CLINIC | Facility: CLINIC | Age: 82
End: 2017-03-01

## 2017-03-01 NOTE — TELEPHONE ENCOUNTER
A \"Physician Verbal Order\" and \"Home Health Certification and Plan of Care,\" which were signed by Michell Villaseñor PA-C and cosigned by Vince Vick D.O., were successfully faxed.

## 2017-03-06 ENCOUNTER — TELEPHONE (OUTPATIENT)
Dept: FAMILY MEDICINE CLINIC | Facility: CLINIC | Age: 82
End: 2017-03-06

## 2017-03-06 NOTE — TELEPHONE ENCOUNTER
On 03/03/2017, the patient's \"Physician Verbal Order\" which was signed by Nghia Turpin PA-C and cosigned by Elena Vincent D.O., was successfully faxed.

## 2017-03-09 ENCOUNTER — TELEPHONE (OUTPATIENT)
Dept: FAMILY MEDICINE CLINIC | Facility: CLINIC | Age: 82
End: 2017-03-09

## 2017-03-15 ENCOUNTER — TELEPHONE (OUTPATIENT)
Dept: FAMILY MEDICINE CLINIC | Facility: CLINIC | Age: 82
End: 2017-03-15

## 2017-03-15 NOTE — TELEPHONE ENCOUNTER
Arpita Ramos from [facility] called and said she is going to add more visits on because of edema and left hip surgery.   She will follow th pt until she is done with PT.

## 2017-04-03 RX ORDER — LOSARTAN POTASSIUM 100 MG/1
TABLET ORAL
Qty: 30 TABLET | Refills: 0 | Status: SHIPPED | OUTPATIENT
Start: 2017-04-03 | End: 2017-05-30

## 2017-04-03 NOTE — TELEPHONE ENCOUNTER
Losartan approved qty 30 NR  Patient is due for a 6 month f/u  Please call to schedule appt-will need for further refills  [phone] (home)

## 2017-04-03 NOTE — TELEPHONE ENCOUNTER
Pt has been informed refill has been approved but will need appointment for future refills. Pt is going to call us back to schedule once she knows her transportation.

## 2017-04-24 RX ORDER — CLOBETASOL PROPIONATE 0.05 G/100ML
SHAMPOO TOPICAL
Qty: 118 ML | Refills: 1 | Status: SHIPPED | OUTPATIENT
Start: 2017-04-24 | End: 2018-01-01 | Stop reason: ALTCHOICE

## 2017-05-30 RX ORDER — LOSARTAN POTASSIUM 100 MG/1
TABLET ORAL
Qty: 30 TABLET | Refills: 0 | Status: SHIPPED | OUTPATIENT
Start: 2017-05-30 | End: 2017-06-29

## 2017-06-29 RX ORDER — LOSARTAN POTASSIUM 100 MG/1
TABLET ORAL
Qty: 30 TABLET | Refills: 0 | Status: SHIPPED | OUTPATIENT
Start: 2017-06-29 | End: 2017-07-27

## 2017-06-29 NOTE — TELEPHONE ENCOUNTER
Losartan approved qty 30 NR  Patient past due for an ov  Please call to schedule appt-will need for any further refills  [phone] (home)

## 2017-06-30 NOTE — TELEPHONE ENCOUNTER
Srinath Avila understood she needs a appt with Fani Sofia for further refills, she will call the office after she sees the pulmonologist.

## 2017-07-05 ENCOUNTER — HOSPITAL ENCOUNTER (OUTPATIENT)
Dept: CT IMAGING | Facility: HOSPITAL | Age: 82
Discharge: HOME | End: 2017-07-05
Attending: INTERNAL MEDICINE
Payer: MEDICARE

## 2017-07-05 DIAGNOSIS — C85.90 LYMPHOMA (HCC): ICD-10-CM

## 2017-07-05 PROCEDURE — 71260 CT THORAX DX C+: CPT | Performed by: INTERNAL MEDICINE

## 2017-07-05 PROCEDURE — 74177 CT ABD & PELVIS W/CONTRAST: CPT | Performed by: INTERNAL MEDICINE

## 2017-07-18 RX ORDER — TIOTROPIUM BROMIDE 18 UG/1
CAPSULE ORAL; RESPIRATORY (INHALATION)
Qty: 30 CAPSULE | Refills: 0 | Status: SHIPPED | OUTPATIENT
Start: 2017-07-18 | End: 2017-08-01

## 2017-07-18 NOTE — TELEPHONE ENCOUNTER
spiriva approved for qty 30 NR  Patient is past due for ov.   Please call to schedule appt-will need for any further refills  [phone] (home)

## 2017-07-24 PROBLEM — R91.1 PULMONARY NODULE, LEFT: Status: ACTIVE | Noted: 2017-07-12

## 2017-07-27 RX ORDER — LOSARTAN POTASSIUM 100 MG/1
TABLET ORAL
Qty: 30 TABLET | Refills: 0 | Status: SHIPPED | OUTPATIENT
Start: 2017-07-27 | End: 2017-08-24

## 2017-07-27 NOTE — TELEPHONE ENCOUNTER
Losartan approved qty 30 NR  Future Appointments  Date Time Provider Howard De La Torre   8/1/2017 9:30 AM Narciso Ibarra PA-C [facility] [place]

## 2017-08-01 ENCOUNTER — OFFICE VISIT (OUTPATIENT)
Dept: FAMILY MEDICINE CLINIC | Facility: CLINIC | Age: 82
End: 2017-08-01

## 2017-08-01 ENCOUNTER — LAB ENCOUNTER (OUTPATIENT)
Dept: LAB | Age: 82
End: 2017-08-01
Attending: FAMILY MEDICINE
Payer: MEDICARE

## 2017-08-01 VITALS
OXYGEN SATURATION: 97 % | BODY MASS INDEX: 20.62 KG/M2 | SYSTOLIC BLOOD PRESSURE: 110 MMHG | HEART RATE: 70 BPM | WEIGHT: 105 LBS | DIASTOLIC BLOOD PRESSURE: 60 MMHG | HEIGHT: 60 IN | RESPIRATION RATE: 16 BRPM

## 2017-08-01 DIAGNOSIS — I10 ESSENTIAL HYPERTENSION: ICD-10-CM

## 2017-08-01 DIAGNOSIS — IMO0001 UNCONTROLLED TYPE 2 DIABETES MELLITUS WITHOUT COMPLICATION, WITHOUT LONG-TERM CURRENT USE OF INSULIN: ICD-10-CM

## 2017-08-01 DIAGNOSIS — E55.9 VITAMIN D DEFICIENCY: ICD-10-CM

## 2017-08-01 DIAGNOSIS — J44.9 COPD, SEVERE (HCC): Primary | ICD-10-CM

## 2017-08-01 DIAGNOSIS — Z79.899 MEDICATION MANAGEMENT: ICD-10-CM

## 2017-08-01 DIAGNOSIS — E11.9 CONTROLLED TYPE 2 DIABETES MELLITUS WITHOUT COMPLICATION, WITHOUT LONG-TERM CURRENT USE OF INSULIN (HCC): ICD-10-CM

## 2017-08-01 DIAGNOSIS — C85.90 LYMPHOMA IN REMISSION (HCC): ICD-10-CM

## 2017-08-01 DIAGNOSIS — M81.0 OSTEOPOROSIS, SENILE: ICD-10-CM

## 2017-08-01 LAB
25-HYDROXYVITAMIN D (TOTAL): 31.6 NG/ML (ref 30–100)
ALBUMIN SERPL-MCNC: 3.7 G/DL (ref 3.5–4.8)
ALP LIVER SERPL-CCNC: 62 U/L (ref 55–142)
ALT SERPL-CCNC: 19 U/L (ref 14–54)
AST SERPL-CCNC: 17 U/L (ref 15–41)
BASOPHILS # BLD AUTO: 0.05 X10(3) UL (ref 0–0.1)
BASOPHILS NFR BLD AUTO: 0.6 %
BILIRUB SERPL-MCNC: 0.3 MG/DL (ref 0.1–2)
BUN BLD-MCNC: 16 MG/DL (ref 8–20)
CALCIUM BLD-MCNC: 9.5 MG/DL (ref 8.3–10.3)
CHLORIDE: 97 MMOL/L (ref 101–111)
CO2: 29 MMOL/L (ref 22–32)
CREAT BLD-MCNC: 0.8 MG/DL (ref 0.55–1.02)
EOSINOPHIL # BLD AUTO: 0.26 X10(3) UL (ref 0–0.3)
EOSINOPHIL NFR BLD AUTO: 3.1 %
ERYTHROCYTE [DISTWIDTH] IN BLOOD BY AUTOMATED COUNT: 14.8 % (ref 11.5–16)
FREE T4: 1.1 NG/DL (ref 0.9–1.8)
GLUCOSE BLD-MCNC: 92 MG/DL (ref 70–99)
HCT VFR BLD AUTO: 38.1 % (ref 34–50)
HGB BLD-MCNC: 12.4 G/DL (ref 12–16)
IMMATURE GRANULOCYTE COUNT: 0.03 X10(3) UL (ref 0–1)
IMMATURE GRANULOCYTE RATIO %: 0.4 %
LYMPHOCYTES # BLD AUTO: 1.58 X10(3) UL (ref 0.9–4)
LYMPHOCYTES NFR BLD AUTO: 18.6 %
M PROTEIN MFR SERPL ELPH: 6.7 G/DL (ref 6.1–8.3)
MCH RBC QN AUTO: 27.9 PG (ref 27–33.2)
MCHC RBC AUTO-ENTMCNC: 32.5 G/DL (ref 31–37)
MCV RBC AUTO: 85.8 FL (ref 81–100)
MONOCYTES # BLD AUTO: 0.74 X10(3) UL (ref 0.1–0.6)
MONOCYTES NFR BLD AUTO: 8.7 %
NEUTROPHIL ABS PRELIM: 5.83 X10 (3) UL (ref 1.3–6.7)
NEUTROPHILS # BLD AUTO: 5.83 X10(3) UL (ref 1.3–6.7)
NEUTROPHILS NFR BLD AUTO: 68.6 %
PLATELET # BLD AUTO: 297 10(3)UL (ref 150–450)
POTASSIUM SERPL-SCNC: 4.4 MMOL/L (ref 3.6–5.1)
RBC # BLD AUTO: 4.44 X10(6)UL (ref 3.8–5.1)
RED CELL DISTRIBUTION WIDTH-SD: 46.6 FL (ref 35.1–46.3)
SODIUM SERPL-SCNC: 132 MMOL/L (ref 136–144)
TSI SER-ACNC: 0.66 MIU/ML (ref 0.35–5.5)
WBC # BLD AUTO: 8.5 X10(3) UL (ref 4–13)

## 2017-08-01 PROCEDURE — 82306 VITAMIN D 25 HYDROXY: CPT

## 2017-08-01 PROCEDURE — 80053 COMPREHEN METABOLIC PANEL: CPT

## 2017-08-01 PROCEDURE — 84443 ASSAY THYROID STIM HORMONE: CPT

## 2017-08-01 PROCEDURE — 99214 OFFICE O/P EST MOD 30 MIN: CPT | Performed by: FAMILY MEDICINE

## 2017-08-01 PROCEDURE — 83036 HEMOGLOBIN GLYCOSYLATED A1C: CPT

## 2017-08-01 PROCEDURE — 85025 COMPLETE CBC W/AUTO DIFF WBC: CPT

## 2017-08-01 PROCEDURE — 84439 ASSAY OF FREE THYROXINE: CPT

## 2017-08-01 PROCEDURE — 36415 COLL VENOUS BLD VENIPUNCTURE: CPT

## 2017-08-01 RX ORDER — MOMETASONE FUROATE AND FORMOTEROL FUMARATE DIHYDRATE 200; 5 UG/1; UG/1
AEROSOL RESPIRATORY (INHALATION)
Refills: 3 | COMMUNITY
Start: 2017-07-03 | End: 2018-01-01

## 2017-08-01 NOTE — PATIENT INSTRUCTIONS
RECLAST infusion for osteoporosis instead of the Actonel.     Will let you know if the kidney function is good enough for the Reclast.

## 2017-08-02 PROBLEM — R09.02 HYPOXIA: Chronic | Status: RESOLVED | Noted: 2017-02-04 | Resolved: 2017-08-02

## 2017-08-02 LAB
EST. AVERAGE GLUCOSE BLD GHB EST-MCNC: 117 MG/DL (ref 68–126)
HBA1C MFR BLD HPLC: 5.7 % (ref ?–5.7)

## 2017-08-02 NOTE — PROGRESS NOTES
Improved Vitamin D take maintenance of dose of 1,000 iu vitamin D OTC. Rest of labs are essentially normal including thyroid, blood counts for anemia and kidney and liver function. Clearance equals 43.82 patient can proceed on with Reclast infusion.   Rec

## 2017-08-03 ENCOUNTER — TELEPHONE (OUTPATIENT)
Dept: FAMILY MEDICINE CLINIC | Facility: CLINIC | Age: 82
End: 2017-08-03

## 2017-08-03 NOTE — TELEPHONE ENCOUNTER
----- Message from Klever Medeiros PA-C sent at 8/2/2017  8:43 PM CDT -----  Hemoglobin A1c is 5.7 patient does not need to follow-up for diabetes we will see her in 6 months, since she is not on any medication and original diabetes was diagnosed while s

## 2017-08-03 NOTE — PROGRESS NOTES
Hemoglobin A1c is 5.7 patient does not need to follow-up for diabetes we will see her in 6 months, since she is not on any medication and original diabetes was diagnosed while she was on prednisone.   Patient has been glucose intolerance since being off pre

## 2017-08-09 ENCOUNTER — TELEPHONE (OUTPATIENT)
Dept: HEMATOLOGY/ONCOLOGY | Facility: HOSPITAL | Age: 82
End: 2017-08-09

## 2017-08-09 NOTE — TELEPHONE ENCOUNTER
Pt will call back when she is able to schedule she does not have a ride at this time and unable to schedule at this time and I called back and left the contact info on her answering machine to call us back when she is able to schedule an appt.

## 2017-08-24 RX ORDER — LOSARTAN POTASSIUM 100 MG/1
TABLET ORAL
Qty: 90 TABLET | Refills: 1 | Status: SHIPPED | OUTPATIENT
Start: 2017-08-24 | End: 2018-01-01

## 2017-09-04 ENCOUNTER — APPOINTMENT (OUTPATIENT)
Dept: GENERAL RADIOLOGY | Facility: HOSPITAL | Age: 82
End: 2017-09-04
Attending: EMERGENCY MEDICINE
Payer: MEDICARE

## 2017-09-04 ENCOUNTER — HOSPITAL ENCOUNTER (OUTPATIENT)
Facility: HOSPITAL | Age: 82
Setting detail: OBSERVATION
Discharge: HOME | End: 2017-09-05
Attending: EMERGENCY MEDICINE | Admitting: INTERNAL MEDICINE
Payer: MEDICARE

## 2017-09-04 ENCOUNTER — APPOINTMENT (OUTPATIENT)
Dept: CT IMAGING | Facility: HOSPITAL | Age: 82
End: 2017-09-04
Attending: EMERGENCY MEDICINE
Payer: MEDICARE

## 2017-09-04 DIAGNOSIS — S20.211A CONTUSION OF RIGHT CHEST WALL, INITIAL ENCOUNTER: ICD-10-CM

## 2017-09-04 DIAGNOSIS — N39.0 URINARY TRACT INFECTION WITHOUT HEMATURIA, SITE UNSPECIFIED: ICD-10-CM

## 2017-09-04 DIAGNOSIS — S20.221A CONTUSION OF RIGHT SIDE OF BACK, INITIAL ENCOUNTER: Primary | ICD-10-CM

## 2017-09-04 LAB
ALBUMIN SERPL-MCNC: 4 G/DL (ref 3.5–4.8)
ALP LIVER SERPL-CCNC: 63 U/L (ref 55–142)
ALT SERPL-CCNC: 19 U/L (ref 14–54)
APTT PPP: 24.4 SECONDS (ref 25–34)
AST SERPL-CCNC: 14 U/L (ref 15–41)
BASOPHILS # BLD AUTO: 0.05 X10(3) UL (ref 0–0.1)
BASOPHILS NFR BLD AUTO: 0.5 %
BILIRUB SERPL-MCNC: 0.3 MG/DL (ref 0.1–2)
BILIRUB UR QL STRIP.AUTO: NEGATIVE
BUN BLD-MCNC: 20 MG/DL (ref 8–20)
CALCIUM BLD-MCNC: 9.4 MG/DL (ref 8.3–10.3)
CHLORIDE: 97 MMOL/L (ref 101–111)
CO2: 30 MMOL/L (ref 22–32)
COLOR UR AUTO: YELLOW
CREAT BLD-MCNC: 0.81 MG/DL (ref 0.55–1.02)
EOSINOPHIL # BLD AUTO: 0.14 X10(3) UL (ref 0–0.3)
EOSINOPHIL NFR BLD AUTO: 1.3 %
ERYTHROCYTE [DISTWIDTH] IN BLOOD BY AUTOMATED COUNT: 14.6 % (ref 11.5–16)
GLUCOSE BLD-MCNC: 120 MG/DL (ref 70–99)
GLUCOSE UR STRIP.AUTO-MCNC: NEGATIVE MG/DL
HCT VFR BLD AUTO: 39.3 % (ref 34–50)
HGB BLD-MCNC: 13.1 G/DL (ref 12–16)
IMMATURE GRANULOCYTE COUNT: 0.05 X10(3) UL (ref 0–1)
IMMATURE GRANULOCYTE RATIO %: 0.5 %
INR BLD: 1.05 (ref 0.89–1.11)
LYMPHOCYTES # BLD AUTO: 1.46 X10(3) UL (ref 0.9–4)
LYMPHOCYTES NFR BLD AUTO: 13.6 %
M PROTEIN MFR SERPL ELPH: 6.8 G/DL (ref 6.1–8.3)
MCH RBC QN AUTO: 28.8 PG (ref 27–33.2)
MCHC RBC AUTO-ENTMCNC: 33.3 G/DL (ref 31–37)
MCV RBC AUTO: 86.4 FL (ref 81–100)
MONOCYTES # BLD AUTO: 0.94 X10(3) UL (ref 0.1–0.6)
MONOCYTES NFR BLD AUTO: 8.7 %
NEUTROPHIL ABS PRELIM: 8.13 X10 (3) UL (ref 1.3–6.7)
NEUTROPHILS # BLD AUTO: 8.13 X10(3) UL (ref 1.3–6.7)
NEUTROPHILS NFR BLD AUTO: 75.4 %
NITRITE UR QL STRIP.AUTO: POSITIVE
PH UR STRIP.AUTO: 5 [PH] (ref 4.5–8)
PLATELET # BLD AUTO: 264 10(3)UL (ref 150–450)
POTASSIUM SERPL-SCNC: 4.7 MMOL/L (ref 3.6–5.1)
PROT UR STRIP.AUTO-MCNC: NEGATIVE MG/DL
PSA SERPL DL<=0.01 NG/ML-MCNC: 13.7 SECONDS (ref 12–14.3)
RBC # BLD AUTO: 4.55 X10(6)UL (ref 3.8–5.1)
RBC UR QL AUTO: NEGATIVE
RED CELL DISTRIBUTION WIDTH-SD: 46.4 FL (ref 35.1–46.3)
SODIUM SERPL-SCNC: 134 MMOL/L (ref 136–144)
SP GR UR STRIP.AUTO: 1.02 (ref 1–1.03)
TROPONIN: <0.046 NG/ML (ref ?–0.05)
UROBILINOGEN UR STRIP.AUTO-MCNC: <2 MG/DL
WBC # BLD AUTO: 10.8 X10(3) UL (ref 4–13)

## 2017-09-04 PROCEDURE — 71101 X-RAY EXAM UNILAT RIBS/CHEST: CPT | Performed by: EMERGENCY MEDICINE

## 2017-09-04 PROCEDURE — 70450 CT HEAD/BRAIN W/O DYE: CPT | Performed by: EMERGENCY MEDICINE

## 2017-09-04 PROCEDURE — 72110 X-RAY EXAM L-2 SPINE 4/>VWS: CPT | Performed by: EMERGENCY MEDICINE

## 2017-09-04 PROCEDURE — 99219 INITIAL OBSERVATION CARE,LEVL II: CPT | Performed by: INTERNAL MEDICINE

## 2017-09-04 PROCEDURE — 73502 X-RAY EXAM HIP UNI 2-3 VIEWS: CPT | Performed by: EMERGENCY MEDICINE

## 2017-09-04 RX ORDER — ACETAMINOPHEN 325 MG/1
650 TABLET ORAL EVERY 6 HOURS PRN
Status: DISCONTINUED | OUTPATIENT
Start: 2017-09-04 | End: 2017-09-05

## 2017-09-04 RX ORDER — DOCUSATE SODIUM 100 MG/1
100 CAPSULE, LIQUID FILLED ORAL 2 TIMES DAILY
Status: DISCONTINUED | OUTPATIENT
Start: 2017-09-05 | End: 2017-09-05

## 2017-09-04 RX ORDER — HYDROMORPHONE HYDROCHLORIDE 1 MG/ML
0.5 INJECTION, SOLUTION INTRAMUSCULAR; INTRAVENOUS; SUBCUTANEOUS EVERY 30 MIN PRN
Status: DISCONTINUED | OUTPATIENT
Start: 2017-09-04 | End: 2017-09-05

## 2017-09-04 RX ORDER — ONDANSETRON 2 MG/ML
4 INJECTION INTRAMUSCULAR; INTRAVENOUS EVERY 4 HOURS PRN
Status: DISCONTINUED | OUTPATIENT
Start: 2017-09-04 | End: 2017-09-05

## 2017-09-04 RX ORDER — ONDANSETRON 2 MG/ML
4 INJECTION INTRAMUSCULAR; INTRAVENOUS EVERY 6 HOURS PRN
Status: DISCONTINUED | OUTPATIENT
Start: 2017-09-04 | End: 2017-09-05

## 2017-09-04 RX ORDER — BISACODYL 10 MG
10 SUPPOSITORY, RECTAL RECTAL
Status: DISCONTINUED | OUTPATIENT
Start: 2017-09-04 | End: 2017-09-05

## 2017-09-04 RX ORDER — POLYETHYLENE GLYCOL 3350 17 G/17G
17 POWDER, FOR SOLUTION ORAL DAILY PRN
Status: DISCONTINUED | OUTPATIENT
Start: 2017-09-04 | End: 2017-09-05

## 2017-09-04 RX ORDER — MORPHINE SULFATE 4 MG/ML
1 INJECTION, SOLUTION INTRAMUSCULAR; INTRAVENOUS EVERY 2 HOUR PRN
Status: DISCONTINUED | OUTPATIENT
Start: 2017-09-04 | End: 2017-09-05

## 2017-09-04 RX ORDER — ACETAMINOPHEN 500 MG
1000 TABLET ORAL ONCE
Status: COMPLETED | OUTPATIENT
Start: 2017-09-04 | End: 2017-09-04

## 2017-09-04 RX ORDER — HYDROCODONE BITARTRATE AND ACETAMINOPHEN 10; 325 MG/1; MG/1
1 TABLET ORAL EVERY 4 HOURS PRN
Status: DISCONTINUED | OUTPATIENT
Start: 2017-09-04 | End: 2017-09-05

## 2017-09-04 RX ORDER — MORPHINE SULFATE 4 MG/ML
2 INJECTION, SOLUTION INTRAMUSCULAR; INTRAVENOUS EVERY 2 HOUR PRN
Status: DISCONTINUED | OUTPATIENT
Start: 2017-09-04 | End: 2017-09-05

## 2017-09-04 RX ORDER — SODIUM PHOSPHATE, DIBASIC AND SODIUM PHOSPHATE, MONOBASIC 7; 19 G/133ML; G/133ML
1 ENEMA RECTAL ONCE AS NEEDED
Status: DISCONTINUED | OUTPATIENT
Start: 2017-09-04 | End: 2017-09-05

## 2017-09-04 RX ORDER — SULFAMETHOXAZOLE AND TRIMETHOPRIM 800; 160 MG/1; MG/1
1 TABLET ORAL ONCE
Status: COMPLETED | OUTPATIENT
Start: 2017-09-04 | End: 2017-09-04

## 2017-09-04 RX ORDER — SODIUM CHLORIDE 9 MG/ML
INJECTION, SOLUTION INTRAVENOUS ONCE
Status: COMPLETED | OUTPATIENT
Start: 2017-09-04 | End: 2017-09-04

## 2017-09-04 RX ORDER — MORPHINE SULFATE 4 MG/ML
4 INJECTION, SOLUTION INTRAMUSCULAR; INTRAVENOUS EVERY 2 HOUR PRN
Status: DISCONTINUED | OUTPATIENT
Start: 2017-09-04 | End: 2017-09-05

## 2017-09-04 NOTE — ED INITIAL ASSESSMENT (HPI)
Pt states she tripped and fell on her right side about 3 hrs ago, initially refused medical treatment. Pt was able to stand up and walk after fall. Later started having right hip and rib pain. No obvious deformity noted. Pt denies LOC.

## 2017-09-04 NOTE — ED PROVIDER NOTES
Patient Seen in: [facility] Emergency Department    History   Patient presents with:  Fall (musculoskeletal, neurologic)    Stated Complaint: fall    HPI    Patient had a fall. She was on some steps in the back of her house and fell.   She states th a thin film to the affected areas only, and left in place for 15 minutes before lathering and rinsing as needed   predniSONE 10 MG Oral Tab,  3 p.o. every morning for 3 days then 2 p.o. every morning for 3 days then 1 p.o. every morning for 5 days   HYDROc °C) (Temporal)   Resp 17   Ht 152.4 cm (5')   Wt 45.4 kg   SpO2 99%   BMI 19.53 kg/m²         Physical Exam    General: The patient is awake, alert, conversant. Patient answers questions quickly and appropriately.   Scalp without large hematoma  Eyes: scle Ketones Urine Trace (*)     Nitrite Urine Positive (*)     Leukocyte Esterase Urine Trace (*)     WBC Urine 5-10 (*)     Squamous Epi.  Cells Moderate (*)     Mucous Urine 1+ (*)     All other components within normal limits   CBC W/ DIFFERENTIAL - Abnor antibiotic  Troponin negative    X-ray right ribs  No acute bony injury to the right ribs.  No acute airspace disease    X-ray right hip  No evidence of hardware loosening or new fracture    X-ray lumbar spine  Stable chronic severe degenerative changes of

## 2017-09-05 VITALS
SYSTOLIC BLOOD PRESSURE: 137 MMHG | HEART RATE: 66 BPM | HEIGHT: 60 IN | TEMPERATURE: 98 F | OXYGEN SATURATION: 98 % | BODY MASS INDEX: 19.63 KG/M2 | DIASTOLIC BLOOD PRESSURE: 59 MMHG | RESPIRATION RATE: 17 BRPM | WEIGHT: 100 LBS

## 2017-09-05 PROBLEM — S20.211A CONTUSION OF RIGHT CHEST WALL: Status: ACTIVE | Noted: 2017-09-04

## 2017-09-05 LAB
ATRIAL RATE: 73 BPM
BASOPHILS # BLD AUTO: 0.05 X10(3) UL (ref 0–0.1)
BASOPHILS NFR BLD AUTO: 0.7 %
BUN BLD-MCNC: 19 MG/DL (ref 8–20)
CALCIUM BLD-MCNC: 9 MG/DL (ref 8.3–10.3)
CHLORIDE: 101 MMOL/L (ref 101–111)
CO2: 27 MMOL/L (ref 22–32)
CREAT BLD-MCNC: 0.72 MG/DL (ref 0.55–1.02)
EOSINOPHIL # BLD AUTO: 0.23 X10(3) UL (ref 0–0.3)
EOSINOPHIL NFR BLD AUTO: 3.3 %
ERYTHROCYTE [DISTWIDTH] IN BLOOD BY AUTOMATED COUNT: 14.7 % (ref 11.5–16)
GLUCOSE BLD-MCNC: 100 MG/DL (ref 70–99)
HCT VFR BLD AUTO: 37.6 % (ref 34–50)
HGB BLD-MCNC: 12.4 G/DL (ref 12–16)
IMMATURE GRANULOCYTE COUNT: 0.01 X10(3) UL (ref 0–1)
IMMATURE GRANULOCYTE RATIO %: 0.1 %
LYMPHOCYTES # BLD AUTO: 1.28 X10(3) UL (ref 0.9–4)
LYMPHOCYTES NFR BLD AUTO: 18.6 %
MCH RBC QN AUTO: 28.4 PG (ref 27–33.2)
MCHC RBC AUTO-ENTMCNC: 33 G/DL (ref 31–37)
MCV RBC AUTO: 86 FL (ref 81–100)
MONOCYTES # BLD AUTO: 0.82 X10(3) UL (ref 0.1–0.6)
MONOCYTES NFR BLD AUTO: 11.9 %
NEUTROPHIL ABS PRELIM: 4.48 X10 (3) UL (ref 1.3–6.7)
NEUTROPHILS # BLD AUTO: 4.48 X10(3) UL (ref 1.3–6.7)
NEUTROPHILS NFR BLD AUTO: 65.4 %
P AXIS: 77 DEGREES
P-R INTERVAL: 186 MS
PLATELET # BLD AUTO: 230 10(3)UL (ref 150–450)
POTASSIUM SERPL-SCNC: 4.4 MMOL/L (ref 3.6–5.1)
Q-T INTERVAL: 372 MS
QRS DURATION: 70 MS
QTC CALCULATION (BEZET): 409 MS
R AXIS: 63 DEGREES
RBC # BLD AUTO: 4.37 X10(6)UL (ref 3.8–5.1)
RED CELL DISTRIBUTION WIDTH-SD: 46.8 FL (ref 35.1–46.3)
SODIUM SERPL-SCNC: 134 MMOL/L (ref 136–144)
T AXIS: 76 DEGREES
VENTRICULAR RATE: 73 BPM
WBC # BLD AUTO: 6.9 X10(3) UL (ref 4–13)

## 2017-09-05 PROCEDURE — 99217 OBSERVATION CARE DISCHARGE: CPT | Performed by: HOSPITALIST

## 2017-09-05 RX ORDER — ASPIRIN 81 MG/1
81 TABLET ORAL DAILY
Status: ON HOLD | COMMUNITY
End: 2018-01-01

## 2017-09-05 RX ORDER — LOSARTAN POTASSIUM 100 MG/1
100 TABLET ORAL
Status: DISCONTINUED | OUTPATIENT
Start: 2017-09-05 | End: 2017-09-05

## 2017-09-05 RX ORDER — ENOXAPARIN SODIUM 100 MG/ML
40 INJECTION SUBCUTANEOUS DAILY
Status: DISCONTINUED | OUTPATIENT
Start: 2017-09-05 | End: 2017-09-05

## 2017-09-05 RX ORDER — MONTELUKAST SODIUM 10 MG/1
10 TABLET ORAL NIGHTLY
Status: DISCONTINUED | OUTPATIENT
Start: 2017-09-05 | End: 2017-09-05

## 2017-09-05 RX ORDER — IPRATROPIUM BROMIDE AND ALBUTEROL SULFATE 2.5; .5 MG/3ML; MG/3ML
3 SOLUTION RESPIRATORY (INHALATION) EVERY 6 HOURS PRN
Status: DISCONTINUED | OUTPATIENT
Start: 2017-09-05 | End: 2017-09-05

## 2017-09-05 RX ORDER — HYDROCODONE BITARTRATE AND ACETAMINOPHEN 5; 325 MG/1; MG/1
1 TABLET ORAL EVERY 4 HOURS PRN
Qty: 14 TABLET | Refills: 0 | Status: SHIPPED | OUTPATIENT
Start: 2017-09-05 | End: 2018-01-01

## 2017-09-05 NOTE — OCCUPATIONAL THERAPY NOTE
OCCUPATIONAL THERAPY EVALUATION - INPATIENT     Room Number: 4883/1263-F  Evaluation Date: 9/5/2017  Type of Evaluation: Initial  Presenting Problem: Back Pain    Physician Order: IP Consult to Occupational Therapy  Reason for Therapy: ADL/IADL Dysfunction dog.    SUBJECTIVE Pt stated, \"I feel pretty good today. \"    Patient self-stated goal is to go home    OBJECTIVE  Precautions: Low vision; Other (Comment) (macular degeneration)  Fall Risk: High fall risk    WEIGHT BEARING RESTRICTION  Weight Bearing Rest pt, pt was able to amb x1 in room and hallway and bathroom with RW and min assist, therapist assist pt to complete ambulation, toileting, LB dressing. Pt requiring rest break after standing due to diziness.   Patient End of Session: Up in chair;Needs met;C Cristy Buckner  Frequency (Obs): 5x/week  Number of Visits to Meet Established Goals: 5    ADL Goals  Patient will perform all ADLs: with supervision    Functional Transfer Goals  Patient will perform all functional transfers:  with supervision    UE Exercise Prog

## 2017-09-05 NOTE — PROGRESS NOTES
NURSING DISCHARGE NOTE    Discharged Home via Wheelchair. Accompanied by Family member  Belongings Taken by patient/family. IV out with cath intact. Dc instructions and rx given for norco. Pt and family updated, denies further questions.

## 2017-09-05 NOTE — PHYSICAL THERAPY NOTE
PHYSICAL THERAPY EVALUATION - INPATIENT     Room Number: 0741/4550-L  Evaluation Date: 9/5/2017  Type of Evaluation: Initial  Physician Order: PT Eval and Treat    Presenting Problem: mechanical fall, right sided low back pain  Reason for Therapy: Leticia ambulation but does hold onto furniture for support. Does not drive due to macular degeneration. SUBJECTIVE  \"It hurts when I walk. \"      Patient self-stated goal is to walk without pain    OBJECTIVE  Precautions: Low vision; Other (Comment) (macular 50.57%   Standardized Score (AM-PAC Scale): 42.13   CMS Modifier (G-Code): CK    FUNCTIONAL ABILITY STATUS  Gait Assessment   Gait Assistance: Supervision  Distance (ft): 50  Assistive Device: Rolling walker  Pattern: Shuffle  Stoop/Curb Assistance: Not te returning to prior to level of function.   DISCHARGE RECOMMENDATIONS  PT Discharge Recommendations: Home with home health PT    PLAN  PT Treatment Plan: Bed mobility;Gait training;Transfer training;Stoop training  Rehab Potential : Good  Frequency (Obs): 5x

## 2017-09-05 NOTE — PROGRESS NOTES
NURSING ADMISSION NOTE      Patient admitted via Cart  Oriented to room. Safety precautions initiated. Bed in low position.   Call light in reach.    -------------------------------------------------  Admission navigator completed  Patient is A&Ox4  C

## 2017-09-05 NOTE — CM/SW NOTE
09/05/17 1400   CM/SW Referral Data   Referral Source Social Work (self-referral)   Reason for Referral Discharge planning   Informant Patient   Pertinent Medical Hx   Primary Care Physician Name [address]   Patient Info   Patient's Mental Status Oriented

## 2017-09-05 NOTE — PROGRESS NOTES
[facility]  Progress Note     Mis Aguero Patient Status:  Observation    1929 MRN [id number]   [facility] 3NE-A Attending Jaylyn Norton MD   Hosp Day # 0 PCP Fransisca Arreola DO     Chief Complaint: pain     S: Patie Fluticasone Furoate-Vilanterol  1 puff Inhalation Daily   • losartan  100 mg Oral Daily   • Montelukast Sodium  10 mg Oral Nightly   • Umeclidinium Bromide  1 puff Inhalation Daily   • enoxaparin  40 mg Subcutaneous Daily   • docusate sodium  100 mg Oral B

## 2017-09-05 NOTE — ED NOTES
Patient back from x-ray, resting comfortably on cart, denies any pain at this time, answers all questions appropriately. Patient informed that family called and was updated on plan of care. Await CT scan.

## 2017-09-05 NOTE — PLAN OF CARE
PAIN - ADULT    • Verbalizes/displays adequate comfort level or patient's stated pain goal Progressing        SAFETY ADULT - FALL    • Free from fall injury Progressing        Assumed pt care at 0730, pt in stable condition. A/o x 3.  Minimal c/o pain at re

## 2017-09-05 NOTE — H&P
[facility]  History and Physical     Loraine Yesika Patient Status:  Emergency    1929 MRN [id number]   Location [address] [facility] Attending Doc MD Basilia   Hosp Day # 0 PCP Mona Wick DO     Chief Complain stroke   • Other[other] [OTHER] Sister      anorexia   • Diabetes Sister    • Heart Disorder Brother 79     AMI   • Cancer Sister      small cell cancer lung       Allergies:   Daliresp [Roflumila*    Pain  Scopolamine                 Comment:HBr SOLN (Temporal)   Resp 17   Ht 5' (1.524 m)   Wt 100 lb (45.4 kg)   SpO2 99%   BMI 19.53 kg/m²   General: No acute distress. Alert and oriented x 3. HEENT: Normocephalic atraumatic. White patchy areas on tongue and mucosa. EOM-I. PERRLA. Anicteric.   Neck: No l

## 2017-09-06 ENCOUNTER — TELEPHONE (OUTPATIENT)
Dept: FAMILY MEDICINE CLINIC | Facility: CLINIC | Age: 82
End: 2017-09-06

## 2017-09-06 NOTE — TELEPHONE ENCOUNTER
Tomas Jolly is calling from [address] [place] [address] to let Dr. Della Louis know they will be taking care of [facility]. Faxing paperwork over for signatures.

## 2017-09-07 ENCOUNTER — PATIENT OUTREACH (OUTPATIENT)
Dept: CASE MANAGEMENT | Age: 82
End: 2017-09-07

## 2017-09-07 DIAGNOSIS — Z02.9 ENCOUNTERS FOR ADMINISTRATIVE PURPOSE: ICD-10-CM

## 2017-09-07 RX ORDER — NAPROXEN SODIUM 220 MG
1-2 TABLET ORAL EVERY 6 HOURS PRN
Status: ON HOLD | COMMUNITY
End: 2018-01-01

## 2017-09-07 NOTE — PROGRESS NOTES
Initial Post Discharge Follow Up   Discharge Date: 9/5/17  Contact Date: 9/7/2017    Consent Verification:  Assessment Completed With: Patient  HIPAA Verified?   Yes    Discharge Dx:  S/p fall, contusion on R side of back and R chest wall r/t fall, UTI w only, and left in place for 15 minutes before lathering and rinsing as needed Disp: 118 mL Rfl: 1   Risedronate Sodium (ACTONEL) 35 MG Oral Tab Take 1 tablet (35 mg total) by mouth every 7 days.  Disp: 4 tablet Rfl: 11   CALCIUM-VITAMIN D OR Take 1 tablet b She denies any barriers to pt keeping/getting to HFU appt.       Your appointments     Date & Time Appointment Department [facility])    Sep 15, 2017 10:00 AM CDT Hospital/SNF F/U with Belinda Crum PA-C [address] Antony-KAREN (Pierre Mendieta

## 2017-09-08 ENCOUNTER — TELEPHONE (OUTPATIENT)
Dept: FAMILY MEDICINE CLINIC | Facility: CLINIC | Age: 82
End: 2017-09-08

## 2017-09-08 RX ORDER — CEPHALEXIN 500 MG/1
500 CAPSULE ORAL 3 TIMES DAILY
Qty: 21 CAPSULE | Refills: 0 | Status: SHIPPED | OUTPATIENT
Start: 2017-09-08 | End: 2017-09-15

## 2017-09-09 NOTE — TELEPHONE ENCOUNTER
Patient was notified of urinary tract infection on 9/8/17 at 9 PM prescription was sent over to pharmacy patient will  tomorrow.   Patient presently is asymptomatic without fever or UTI symptoms though does states she is tired and does have a follow-

## 2017-09-15 ENCOUNTER — OFFICE VISIT (OUTPATIENT)
Dept: FAMILY MEDICINE CLINIC | Facility: CLINIC | Age: 82
End: 2017-09-15

## 2017-09-15 VITALS
HEART RATE: 71 BPM | DIASTOLIC BLOOD PRESSURE: 60 MMHG | TEMPERATURE: 98 F | RESPIRATION RATE: 22 BRPM | BODY MASS INDEX: 19 KG/M2 | OXYGEN SATURATION: 98 % | SYSTOLIC BLOOD PRESSURE: 130 MMHG | WEIGHT: 99.5 LBS

## 2017-09-15 DIAGNOSIS — Z23 NEED FOR VACCINATION: Primary | ICD-10-CM

## 2017-09-15 DIAGNOSIS — J44.9 CHRONIC OBSTRUCTIVE PULMONARY DISEASE, UNSPECIFIED COPD TYPE (HCC): ICD-10-CM

## 2017-09-15 DIAGNOSIS — S20.221A CONTUSION OF RIGHT SIDE OF BACK, INITIAL ENCOUNTER: ICD-10-CM

## 2017-09-15 DIAGNOSIS — B37.0 ORAL CANDIDA: ICD-10-CM

## 2017-09-15 DIAGNOSIS — N39.0 URINARY TRACT INFECTION WITHOUT HEMATURIA, SITE UNSPECIFIED: ICD-10-CM

## 2017-09-15 DIAGNOSIS — H54.8 LEGALLY BLIND: ICD-10-CM

## 2017-09-15 DIAGNOSIS — S20.211D CONTUSION OF RIGHT CHEST WALL, SUBSEQUENT ENCOUNTER: ICD-10-CM

## 2017-09-15 PROCEDURE — 99495 TRANSJ CARE MGMT MOD F2F 14D: CPT | Performed by: FAMILY MEDICINE

## 2017-09-15 PROCEDURE — G0008 ADMIN INFLUENZA VIRUS VAC: HCPCS | Performed by: FAMILY MEDICINE

## 2017-09-15 PROCEDURE — 90653 IIV ADJUVANT VACCINE IM: CPT | Performed by: FAMILY MEDICINE

## 2017-09-15 NOTE — PROGRESS NOTES
HPI:    Diaz Wahl is a 80year old female here today for hospital follow up. She was discharged from Inpatient hospital, [facility] to Home   Admission Date: 9/4/17   Discharge Date: 9/5/17  Hospital Discharge Diagnosis:   1.  Mechanical fa living. ? PT and RN home health care    Medication Reconciliation:  I am aware of an inpatient discharge within the last 30 days. The discharge medication list has been reconciled with the patient's current medication list and reviewed by me.   See medica HYDROcodone-acetaminophen 5-325 MG Oral Tab Take 1 tablet by mouth every 4 (four) hours as needed. aspirin 81 MG Oral Tab EC Take 81 mg by mouth daily.    LOSARTAN 100 MG Oral Tab TAKE 1 TABLET BY MOUTH DAILY   DULERA 200-5 MCG/ACT Inhalation Aerosol In GENERAL: weight stable, energy stable, no sweating  SKIN: denies any unusual skin lesions  EYES: Legally blind  HEENT: denies nasal congestion, sinus pain or ST  LUNGS: Severe COPD has chronic stable shortness of breath with exertion  CARDIOVASCULAR: den encounter  Patient is continue with physical therapy and nursing home health care. Work on balance as well and strength.   Lidocaine patches as needed  Oral candida  sxs resolved  Urinary tract infection without hematuria, site unspecified  sxs resolved ne

## 2017-09-18 ENCOUNTER — TELEPHONE (OUTPATIENT)
Dept: FAMILY MEDICINE CLINIC | Facility: CLINIC | Age: 82
End: 2017-09-18

## 2017-09-18 NOTE — TELEPHONE ENCOUNTER
JESSICA Conley, MARIA ELENA             Can home health care do a UA and urine culture on her to make sure the UTI has resolved we did not get this in the office visits.       JESSICA Conley, MARIA ELENA             Give her the in

## 2017-09-25 ENCOUNTER — LAB REQUISITION (OUTPATIENT)
Dept: LAB | Facility: HOSPITAL | Age: 82
End: 2017-09-25
Attending: FAMILY MEDICINE
Payer: MEDICARE

## 2017-09-25 DIAGNOSIS — Z87.440 PERSONAL HISTORY OF URINARY INFECTION: ICD-10-CM

## 2017-09-25 LAB
BILIRUB UR QL STRIP.AUTO: NEGATIVE
COLOR UR AUTO: YELLOW
GLUCOSE UR STRIP.AUTO-MCNC: NEGATIVE MG/DL
KETONES UR STRIP.AUTO-MCNC: NEGATIVE MG/DL
NITRITE UR QL STRIP.AUTO: POSITIVE
PH UR STRIP.AUTO: 6 [PH] (ref 4.5–8)
PROT UR STRIP.AUTO-MCNC: NEGATIVE MG/DL
RBC UR QL AUTO: NEGATIVE
SP GR UR STRIP.AUTO: 1.01 (ref 1–1.03)
UROBILINOGEN UR STRIP.AUTO-MCNC: <2 MG/DL
WBC #/AREA URNS AUTO: >50 /HPF

## 2017-09-25 PROCEDURE — 81001 URINALYSIS AUTO W/SCOPE: CPT | Performed by: FAMILY MEDICINE

## 2017-09-25 PROCEDURE — 87077 CULTURE AEROBIC IDENTIFY: CPT | Performed by: FAMILY MEDICINE

## 2017-09-25 PROCEDURE — 87186 SC STD MICRODIL/AGAR DIL: CPT | Performed by: FAMILY MEDICINE

## 2017-09-25 PROCEDURE — 87086 URINE CULTURE/COLONY COUNT: CPT | Performed by: FAMILY MEDICINE

## 2017-09-28 NOTE — PROGRESS NOTES
Has bacterial infection start Bactrim DS bid for 7 days. We will change if the organism is not sensitive to the antibiotic. Repeat the urine culture after finishes antibiotic.

## 2017-09-29 ENCOUNTER — TELEPHONE (OUTPATIENT)
Dept: FAMILY MEDICINE CLINIC | Facility: CLINIC | Age: 82
End: 2017-09-29

## 2017-09-29 RX ORDER — SULFAMETHOXAZOLE AND TRIMETHOPRIM 800; 160 MG/1; MG/1
1 TABLET ORAL 2 TIMES DAILY
Qty: 14 TABLET | Refills: 0 | Status: SHIPPED | OUTPATIENT
Start: 2017-09-29 | End: 2017-12-27

## 2017-09-29 NOTE — TELEPHONE ENCOUNTER
Left detailed msg on pt's vm with instructions/results. Also did speak to MARIA ELENA Shultz from home health with this information as well. Asked pt after reviewing message to call office with any questions.

## 2017-09-29 NOTE — TELEPHONE ENCOUNTER
----- Message from Sepideh Wood PA-C sent at 9/28/2017  6:36 PM CDT -----  Has bacterial infection start Bactrim DS bid for 7 days. We will change if the organism is not sensitive to the antibiotic. Repeat the urine culture after finishes antibiotic.

## 2017-10-04 ENCOUNTER — TELEPHONE (OUTPATIENT)
Dept: FAMILY MEDICINE CLINIC | Facility: CLINIC | Age: 82
End: 2017-10-04

## 2017-10-04 NOTE — TELEPHONE ENCOUNTER
A \"Physician Order,\" which was signed by Bertis Litten, PA-C and cosigned by Mj Morgan D.O., was successfully faxed to Christophe Frost.

## 2017-10-17 ENCOUNTER — APPOINTMENT (OUTPATIENT)
Dept: LAB | Age: 82
End: 2017-10-17
Attending: FAMILY MEDICINE
Payer: MEDICARE

## 2017-10-17 PROCEDURE — 87186 SC STD MICRODIL/AGAR DIL: CPT

## 2017-10-17 PROCEDURE — 81003 URINALYSIS AUTO W/O SCOPE: CPT | Performed by: FAMILY MEDICINE

## 2017-10-17 PROCEDURE — 87186 SC STD MICRODIL/AGAR DIL: CPT | Performed by: FAMILY MEDICINE

## 2017-10-17 PROCEDURE — 87086 URINE CULTURE/COLONY COUNT: CPT | Performed by: FAMILY MEDICINE

## 2017-10-17 PROCEDURE — 87086 URINE CULTURE/COLONY COUNT: CPT

## 2017-10-20 ENCOUNTER — TELEPHONE (OUTPATIENT)
Dept: FAMILY MEDICINE CLINIC | Facility: CLINIC | Age: 82
End: 2017-10-20

## 2017-10-20 DIAGNOSIS — N39.0 URINARY TRACT INFECTION WITHOUT HEMATURIA, SITE UNSPECIFIED: Primary | ICD-10-CM

## 2017-10-20 RX ORDER — AMOXICILLIN 875 MG/1
875 TABLET, COATED ORAL 2 TIMES DAILY
Qty: 14 TABLET | Refills: 0 | Status: SHIPPED | OUTPATIENT
Start: 2017-10-20 | End: 2017-12-27

## 2017-10-20 NOTE — TELEPHONE ENCOUNTER
----- Message from Lopez Sandra PA-C sent at 10/19/2017  9:21 PM CDT -----  Patient has enterococcus species in urine now the Pseudomonas is resolved start amoxicillin 875 mg twice daily for 7 days. Repeat urinalysis and urine culture in 10 days.

## 2017-10-20 NOTE — TELEPHONE ENCOUNTER
Left message for patient that UC culture had bacterial growth and that an rx for amoxicillin has been sent to her pharmacy

## 2017-10-25 ENCOUNTER — TELEPHONE (OUTPATIENT)
Dept: FAMILY MEDICINE CLINIC | Facility: CLINIC | Age: 82
End: 2017-10-25

## 2017-10-25 NOTE — TELEPHONE ENCOUNTER
Shirley Cary from [facility] called and said the urine culture for this pt was abnormal.

## 2017-11-02 ENCOUNTER — TELEPHONE (OUTPATIENT)
Dept: FAMILY MEDICINE CLINIC | Facility: CLINIC | Age: 82
End: 2017-11-02

## 2017-11-02 DIAGNOSIS — N39.0 URINARY TRACT INFECTION WITHOUT HEMATURIA, SITE UNSPECIFIED: Primary | ICD-10-CM

## 2017-11-02 NOTE — TELEPHONE ENCOUNTER
[facility] from [facility] called and said pt will be ending [address] this Friday do you want her to continue or discharge her?   [facility] also said pt tested positive for urine culture and she will be finishing her antibiotic in a day or so, do you want her to r

## 2017-11-02 NOTE — TELEPHONE ENCOUNTER
Patient does need to repeat UA & culture    Lupe Sharpe, do you want patient to continue [facility]?

## 2017-11-03 NOTE — TELEPHONE ENCOUNTER
Spoke to [facility] and they want to re-cert the patient for home health as well so orders placed in system for the UA and culture. They will go out and see the patient next week and collect the specimen when they are out there.

## 2017-11-03 NOTE — TELEPHONE ENCOUNTER
JESSICA Celaya [facility]   Caller: Unspecified (Yesterday,  9:15 AM)             Repeat UA and urine culture before she is released from home health care this week           Left message for Sumit Renteria  asking if a UA and cult

## 2017-11-13 ENCOUNTER — TELEPHONE (OUTPATIENT)
Dept: FAMILY MEDICINE CLINIC | Facility: CLINIC | Age: 82
End: 2017-11-13

## 2017-11-13 ENCOUNTER — LAB REQUISITION (OUTPATIENT)
Dept: LAB | Facility: HOSPITAL | Age: 82
End: 2017-11-13
Payer: MEDICARE

## 2017-11-13 DIAGNOSIS — N39.0 URINARY TRACT INFECTION: ICD-10-CM

## 2017-11-13 PROCEDURE — 87086 URINE CULTURE/COLONY COUNT: CPT | Performed by: FAMILY MEDICINE

## 2017-11-13 PROCEDURE — 87077 CULTURE AEROBIC IDENTIFY: CPT | Performed by: FAMILY MEDICINE

## 2017-11-13 PROCEDURE — 87186 SC STD MICRODIL/AGAR DIL: CPT | Performed by: FAMILY MEDICINE

## 2017-11-13 PROCEDURE — 81001 URINALYSIS AUTO W/SCOPE: CPT | Performed by: FAMILY MEDICINE

## 2017-11-13 NOTE — TELEPHONE ENCOUNTER
The patient's \"FOLLOW UP ([facility]/RECERT),\" which was signed by Grace Moreno PA-C and cosigned by Flor Blanco D.O., was successfully faxed.

## 2017-11-15 ENCOUNTER — TELEPHONE (OUTPATIENT)
Dept: FAMILY MEDICINE CLINIC | Facility: CLINIC | Age: 82
End: 2017-11-15

## 2017-11-15 RX ORDER — LEVOFLOXACIN 250 MG/1
250 TABLET ORAL DAILY
Qty: 10 TABLET | Refills: 0 | Status: SHIPPED | OUTPATIENT
Start: 2017-11-15 | End: 2017-12-27

## 2017-11-15 NOTE — TELEPHONE ENCOUNTER
----- Message from Fredis Rogers PA-C sent at 11/15/2017  8:12 AM CST -----  Patient continues to get various bacteria with urinary tract infection, the organism keeps changing.   Now she has Klebsiella and E. coli start patient on Levaquin 250 mg daily

## 2017-11-15 NOTE — PROGRESS NOTES
Patient continues to get various bacteria with urinary tract infection, the organism keeps changing. Now she has Klebsiella and E. coli start patient on Levaquin 250 mg daily for 10 days and refer to urology due to urinary tract infection recurrence.   Giv

## 2017-11-15 NOTE — TELEPHONE ENCOUNTER
Spoke to patient with results/instructions. Gave her number to Dr. Tim Glaser to contact.   Med sent to pharmacy

## 2017-11-15 NOTE — TELEPHONE ENCOUNTER
The patient's [facility] Certification and Plan of Care,\" which was signed by Daniela Gamboa PA-C and cosigned by Benita Christiansen D.O., was successfully faxed.

## 2017-11-28 ENCOUNTER — TELEPHONE (OUTPATIENT)
Dept: FAMILY MEDICINE CLINIC | Facility: CLINIC | Age: 82
End: 2017-11-28

## 2017-11-29 NOTE — TELEPHONE ENCOUNTER
The patient's \"MVCDOMNMJ Order,\" which was signed by Michell Villaseñor PA-C and cosigned by Vince Vick D.O., was successfully faxed on 11/28/2017.

## 2017-12-07 ENCOUNTER — TELEPHONE (OUTPATIENT)
Dept: FAMILY MEDICINE CLINIC | Facility: CLINIC | Age: 82
End: 2017-12-07

## 2017-12-15 ENCOUNTER — TELEPHONE (OUTPATIENT)
Dept: FAMILY MEDICINE CLINIC | Facility: CLINIC | Age: 82
End: 2017-12-15

## 2017-12-15 NOTE — TELEPHONE ENCOUNTER
Dr. Ramses Wilson received a call this AM from [facility] at 8:18am from this number regarding this pt.     Called back and was unable to leave a message as the voicemail box was full

## 2017-12-21 ENCOUNTER — TELEPHONE (OUTPATIENT)
Dept: FAMILY MEDICINE CLINIC | Facility: CLINIC | Age: 82
End: 2017-12-21

## 2017-12-21 NOTE — TELEPHONE ENCOUNTER
Anne Richard from [facility] called and would like to know if Dr. Leesa Du can write orders for continuation of care for slight congestion, inhaler use and respiratory status.

## 2017-12-27 PROCEDURE — 81003 URINALYSIS AUTO W/O SCOPE: CPT | Performed by: UROLOGY

## 2017-12-29 NOTE — TELEPHONE ENCOUNTER
Kacey Garcia from [address] [facility] would like an order for continuation of care for patient for UTI's. Patient saw Dr Karely Renteria on 12/27/2017. She ordered medication for patient but patient is not sure what was ordered.  Urologist also wants patient to repeat urine culture in 2

## 2018-01-01 ENCOUNTER — HOSPITAL ENCOUNTER (INPATIENT)
Facility: HOSPITAL | Age: 83
LOS: 4 days | Discharge: HOSPICE | DRG: 190 | End: 2018-01-01
Attending: EMERGENCY MEDICINE | Admitting: INTERNAL MEDICINE
Payer: MEDICARE

## 2018-01-01 ENCOUNTER — PATIENT OUTREACH (OUTPATIENT)
Dept: CASE MANAGEMENT | Age: 83
End: 2018-01-01

## 2018-01-01 ENCOUNTER — APPOINTMENT (OUTPATIENT)
Dept: GENERAL RADIOLOGY | Facility: HOSPITAL | Age: 83
End: 2018-01-01
Attending: INTERNAL MEDICINE
Payer: MEDICARE

## 2018-01-01 ENCOUNTER — APPOINTMENT (OUTPATIENT)
Dept: GENERAL RADIOLOGY | Facility: HOSPITAL | Age: 83
End: 2018-01-01
Payer: MEDICARE

## 2018-01-01 ENCOUNTER — SURGERY (OUTPATIENT)
Age: 83
End: 2018-01-01

## 2018-01-01 ENCOUNTER — TELEPHONE (OUTPATIENT)
Dept: FAMILY MEDICINE CLINIC | Facility: CLINIC | Age: 83
End: 2018-01-01

## 2018-01-01 ENCOUNTER — HOSPITAL ENCOUNTER (OUTPATIENT)
Dept: RADIATION ONCOLOGY | Facility: HOSPITAL | Age: 83
Discharge: HOME | End: 2018-01-01
Attending: RADIOLOGY
Payer: MEDICARE

## 2018-01-01 ENCOUNTER — OFFICE VISIT (OUTPATIENT)
Dept: FAMILY MEDICINE CLINIC | Facility: CLINIC | Age: 83
End: 2018-01-01

## 2018-01-01 ENCOUNTER — HOSPITAL ENCOUNTER (OUTPATIENT)
Dept: CT IMAGING | Facility: HOSPITAL | Age: 83
Discharge: HOME | End: 2018-01-01
Attending: INTERNAL MEDICINE
Payer: MEDICARE

## 2018-01-01 ENCOUNTER — APPOINTMENT (OUTPATIENT)
Dept: RADIATION ONCOLOGY | Facility: HOSPITAL | Age: 83
End: 2018-01-01
Attending: RADIOLOGY
Payer: MEDICARE

## 2018-01-01 ENCOUNTER — ANESTHESIA (OUTPATIENT)
Dept: ENDOSCOPY | Facility: HOSPITAL | Age: 83
End: 2018-01-01
Payer: MEDICARE

## 2018-01-01 ENCOUNTER — APPOINTMENT (OUTPATIENT)
Dept: GENERAL RADIOLOGY | Facility: HOSPITAL | Age: 83
DRG: 054 | End: 2018-01-01
Attending: EMERGENCY MEDICINE
Payer: MEDICARE

## 2018-01-01 ENCOUNTER — APPOINTMENT (OUTPATIENT)
Dept: GENERAL RADIOLOGY | Facility: HOSPITAL | Age: 83
End: 2018-01-01
Attending: EMERGENCY MEDICINE
Payer: MEDICARE

## 2018-01-01 ENCOUNTER — HOSPITAL ENCOUNTER (INPATIENT)
Facility: HOSPITAL | Age: 83
LOS: 3 days | Discharge: NURSING, REHAB OR LONG-TERM CARE | DRG: 054 | End: 2018-01-01
Attending: EMERGENCY MEDICINE | Admitting: HOSPITALIST
Payer: MEDICARE

## 2018-01-01 ENCOUNTER — HOSPITAL ENCOUNTER (OUTPATIENT)
Facility: HOSPITAL | Age: 83
Setting detail: OBSERVATION
Discharge: HOME | End: 2018-01-01
Attending: EMERGENCY MEDICINE | Admitting: HOSPITALIST
Payer: MEDICARE

## 2018-01-01 ENCOUNTER — HOSPITAL ENCOUNTER (INPATIENT)
Dept: RADIATION ONCOLOGY | Facility: HOSPITAL | Age: 83
Discharge: HOME | DRG: 054 | End: 2018-01-01
Attending: RADIOLOGY
Payer: MEDICARE

## 2018-01-01 ENCOUNTER — APPOINTMENT (OUTPATIENT)
Dept: MRI IMAGING | Facility: HOSPITAL | Age: 83
DRG: 054 | End: 2018-01-01
Attending: HOSPITALIST
Payer: MEDICARE

## 2018-01-01 ENCOUNTER — DOCUMENTATION ONLY (OUTPATIENT)
Dept: RADIATION ONCOLOGY | Facility: HOSPITAL | Age: 83
End: 2018-01-01

## 2018-01-01 ENCOUNTER — HOSPITAL ENCOUNTER (OUTPATIENT)
Facility: HOSPITAL | Age: 83
Setting detail: HOSPITAL OUTPATIENT SURGERY
Discharge: HOME | End: 2018-01-01
Attending: INTERNAL MEDICINE | Admitting: INTERNAL MEDICINE
Payer: MEDICARE

## 2018-01-01 ENCOUNTER — APPOINTMENT (OUTPATIENT)
Dept: CT IMAGING | Facility: HOSPITAL | Age: 83
End: 2018-01-01
Attending: EMERGENCY MEDICINE
Payer: MEDICARE

## 2018-01-01 ENCOUNTER — APPOINTMENT (OUTPATIENT)
Dept: GENERAL RADIOLOGY | Facility: HOSPITAL | Age: 83
DRG: 190 | End: 2018-01-01
Attending: EMERGENCY MEDICINE
Payer: MEDICARE

## 2018-01-01 ENCOUNTER — HOSPITAL ENCOUNTER (OUTPATIENT)
Dept: RADIATION ONCOLOGY | Facility: HOSPITAL | Age: 83
Discharge: HOME | End: 2018-01-01
Attending: NEUROLOGICAL SURGERY
Payer: MEDICARE

## 2018-01-01 ENCOUNTER — HOSPITAL ENCOUNTER (OUTPATIENT)
Facility: HOSPITAL | Age: 83
Setting detail: OBSERVATION
Discharge: HOME | End: 2018-01-01
Attending: EMERGENCY MEDICINE | Admitting: INTERNAL MEDICINE
Payer: MEDICARE

## 2018-01-01 ENCOUNTER — HOSPITAL ENCOUNTER (OUTPATIENT)
Dept: RADIATION ONCOLOGY | Age: 83
Discharge: HOME | End: 2018-01-01
Attending: RADIOLOGY
Payer: MEDICARE

## 2018-01-01 ENCOUNTER — HOSPITAL ENCOUNTER (OUTPATIENT)
Dept: NUCLEAR MEDICINE | Facility: HOSPITAL | Age: 83
Discharge: HOME | End: 2018-01-01
Attending: INTERNAL MEDICINE
Payer: MEDICARE

## 2018-01-01 ENCOUNTER — SNF VISIT (OUTPATIENT)
Dept: INTERNAL MEDICINE CLINIC | Age: 83
End: 2018-01-01

## 2018-01-01 ENCOUNTER — APPOINTMENT (OUTPATIENT)
Dept: CT IMAGING | Facility: HOSPITAL | Age: 83
DRG: 190 | End: 2018-01-01
Attending: EMERGENCY MEDICINE
Payer: MEDICARE

## 2018-01-01 ENCOUNTER — SOCIAL WORK SERVICES (OUTPATIENT)
Dept: HEMATOLOGY/ONCOLOGY | Facility: HOSPITAL | Age: 83
End: 2018-01-01

## 2018-01-01 ENCOUNTER — TELEPHONE (OUTPATIENT)
Dept: RADIATION ONCOLOGY | Facility: HOSPITAL | Age: 83
End: 2018-01-01

## 2018-01-01 ENCOUNTER — ANESTHESIA EVENT (OUTPATIENT)
Dept: ENDOSCOPY | Facility: HOSPITAL | Age: 83
End: 2018-01-01
Payer: MEDICARE

## 2018-01-01 ENCOUNTER — APPOINTMENT (OUTPATIENT)
Dept: CV DIAGNOSTICS | Facility: HOSPITAL | Age: 83
End: 2018-01-01
Attending: HOSPITALIST
Payer: MEDICARE

## 2018-01-01 ENCOUNTER — SNF ADMIT/H&P (OUTPATIENT)
Dept: FAMILY MEDICINE CLINIC | Facility: CLINIC | Age: 83
End: 2018-01-01

## 2018-01-01 ENCOUNTER — HOSPITAL ENCOUNTER (OUTPATIENT)
Dept: MRI IMAGING | Age: 83
Discharge: HOME | End: 2018-01-01
Attending: INTERNAL MEDICINE
Payer: MEDICARE

## 2018-01-01 ENCOUNTER — APPOINTMENT (OUTPATIENT)
Dept: GENERAL RADIOLOGY | Facility: HOSPITAL | Age: 83
DRG: 190 | End: 2018-01-01
Attending: STUDENT IN AN ORGANIZED HEALTH CARE EDUCATION/TRAINING PROGRAM
Payer: MEDICARE

## 2018-01-01 ENCOUNTER — APPOINTMENT (OUTPATIENT)
Dept: CT IMAGING | Facility: HOSPITAL | Age: 83
DRG: 054 | End: 2018-01-01
Attending: EMERGENCY MEDICINE
Payer: MEDICARE

## 2018-01-01 ENCOUNTER — NURSE ONLY (OUTPATIENT)
Dept: LAB | Age: 83
End: 2018-01-01
Attending: FAMILY MEDICINE
Payer: MEDICARE

## 2018-01-01 VITALS — HEART RATE: 62 BPM | RESPIRATION RATE: 16 BRPM

## 2018-01-01 VITALS
BODY MASS INDEX: 18.46 KG/M2 | HEART RATE: 87 BPM | RESPIRATION RATE: 18 BRPM | DIASTOLIC BLOOD PRESSURE: 64 MMHG | TEMPERATURE: 98 F | OXYGEN SATURATION: 92 % | HEIGHT: 60 IN | WEIGHT: 94 LBS | SYSTOLIC BLOOD PRESSURE: 133 MMHG

## 2018-01-01 VITALS
OXYGEN SATURATION: 94 % | TEMPERATURE: 98 F | WEIGHT: 93.69 LBS | DIASTOLIC BLOOD PRESSURE: 48 MMHG | HEIGHT: 59 IN | RESPIRATION RATE: 20 BRPM | HEART RATE: 43 BPM | BODY MASS INDEX: 18.89 KG/M2 | SYSTOLIC BLOOD PRESSURE: 152 MMHG

## 2018-01-01 VITALS
HEART RATE: 80 BPM | DIASTOLIC BLOOD PRESSURE: 74 MMHG | BODY MASS INDEX: 17.87 KG/M2 | SYSTOLIC BLOOD PRESSURE: 136 MMHG | HEIGHT: 59.75 IN | WEIGHT: 91 LBS

## 2018-01-01 VITALS
SYSTOLIC BLOOD PRESSURE: 123 MMHG | DIASTOLIC BLOOD PRESSURE: 53 MMHG | HEART RATE: 59 BPM | TEMPERATURE: 98 F | OXYGEN SATURATION: 97 % | RESPIRATION RATE: 18 BRPM

## 2018-01-01 VITALS
WEIGHT: 91 LBS | HEART RATE: 72 BPM | DIASTOLIC BLOOD PRESSURE: 51 MMHG | OXYGEN SATURATION: 100 % | SYSTOLIC BLOOD PRESSURE: 120 MMHG | TEMPERATURE: 98 F | RESPIRATION RATE: 18 BRPM | HEIGHT: 59 IN | BODY MASS INDEX: 18.35 KG/M2

## 2018-01-01 VITALS
HEIGHT: 60 IN | DIASTOLIC BLOOD PRESSURE: 52 MMHG | TEMPERATURE: 98 F | WEIGHT: 91.5 LBS | BODY MASS INDEX: 17.96 KG/M2 | RESPIRATION RATE: 20 BRPM | OXYGEN SATURATION: 96 % | SYSTOLIC BLOOD PRESSURE: 142 MMHG | HEART RATE: 91 BPM

## 2018-01-01 VITALS
HEART RATE: 79 BPM | WEIGHT: 94 LBS | RESPIRATION RATE: 17 BRPM | OXYGEN SATURATION: 98 % | SYSTOLIC BLOOD PRESSURE: 136 MMHG | DIASTOLIC BLOOD PRESSURE: 67 MMHG | BODY MASS INDEX: 18 KG/M2

## 2018-01-01 VITALS
OXYGEN SATURATION: 94 % | DIASTOLIC BLOOD PRESSURE: 63 MMHG | SYSTOLIC BLOOD PRESSURE: 161 MMHG | BODY MASS INDEX: 18.46 KG/M2 | WEIGHT: 94 LBS | RESPIRATION RATE: 16 BRPM | HEIGHT: 60 IN | HEART RATE: 74 BPM | TEMPERATURE: 99 F

## 2018-01-01 VITALS
RESPIRATION RATE: 24 BRPM | HEART RATE: 86 BPM | SYSTOLIC BLOOD PRESSURE: 137 MMHG | TEMPERATURE: 98 F | WEIGHT: 93.94 LBS | DIASTOLIC BLOOD PRESSURE: 53 MMHG | BODY MASS INDEX: 18.94 KG/M2 | OXYGEN SATURATION: 96 % | HEIGHT: 59 IN

## 2018-01-01 VITALS — DIASTOLIC BLOOD PRESSURE: 70 MMHG | SYSTOLIC BLOOD PRESSURE: 128 MMHG | HEART RATE: 81 BPM

## 2018-01-01 VITALS
HEART RATE: 82 BPM | WEIGHT: 92 LBS | OXYGEN SATURATION: 92 % | DIASTOLIC BLOOD PRESSURE: 73 MMHG | RESPIRATION RATE: 17 BRPM | SYSTOLIC BLOOD PRESSURE: 154 MMHG | BODY MASS INDEX: 18 KG/M2

## 2018-01-01 VITALS
RESPIRATION RATE: 18 BRPM | HEART RATE: 79 BPM | OXYGEN SATURATION: 94 % | DIASTOLIC BLOOD PRESSURE: 75 MMHG | SYSTOLIC BLOOD PRESSURE: 151 MMHG

## 2018-01-01 VITALS — SYSTOLIC BLOOD PRESSURE: 118 MMHG | OXYGEN SATURATION: 97 % | HEART RATE: 70 BPM | DIASTOLIC BLOOD PRESSURE: 70 MMHG

## 2018-01-01 DIAGNOSIS — C34.12 MALIGNANT NEOPLASM OF UPPER LOBE OF LEFT LUNG (HCC): ICD-10-CM

## 2018-01-01 DIAGNOSIS — H54.8 LEGALLY BLIND: ICD-10-CM

## 2018-01-01 DIAGNOSIS — M15.3 OTHER SECONDARY OSTEOARTHRITIS OF MULTIPLE SITES: ICD-10-CM

## 2018-01-01 DIAGNOSIS — R53.1 GENERALIZED WEAKNESS: ICD-10-CM

## 2018-01-01 DIAGNOSIS — G93.89 BRAIN MASS: ICD-10-CM

## 2018-01-01 DIAGNOSIS — I48.91 ATRIAL FIBRILLATION (HCC): ICD-10-CM

## 2018-01-01 DIAGNOSIS — C34.90 LUNG CANCER (HCC): ICD-10-CM

## 2018-01-01 DIAGNOSIS — C85.90 LYMPHOMA IN REMISSION (HCC): ICD-10-CM

## 2018-01-01 DIAGNOSIS — I10 ESSENTIAL HYPERTENSION: Primary | ICD-10-CM

## 2018-01-01 DIAGNOSIS — C34.92 NON-SMALL CELL CANCER OF LEFT LUNG (HCC): ICD-10-CM

## 2018-01-01 DIAGNOSIS — E87.1 HYPONATREMIA: ICD-10-CM

## 2018-01-01 DIAGNOSIS — G93.89 BRAIN MASS: Primary | ICD-10-CM

## 2018-01-01 DIAGNOSIS — C34.12 PRIMARY MALIGNANT NEOPLASM OF LEFT UPPER LOBE OF LUNG (HCC): Primary | ICD-10-CM

## 2018-01-01 DIAGNOSIS — Z02.9 ENCOUNTERS FOR ADMINISTRATIVE PURPOSE: ICD-10-CM

## 2018-01-01 DIAGNOSIS — Z51.5 PALLIATIVE CARE ENCOUNTER: ICD-10-CM

## 2018-01-01 DIAGNOSIS — S72.145D CLOSED NONDISPLACED INTERTROCHANTERIC FRACTURE OF LEFT FEMUR WITH ROUTINE HEALING, SUBSEQUENT ENCOUNTER: ICD-10-CM

## 2018-01-01 DIAGNOSIS — C79.31 BRAIN METASTASIS (HCC): Primary | ICD-10-CM

## 2018-01-01 DIAGNOSIS — R91.1 PULMONARY NODULE: ICD-10-CM

## 2018-01-01 DIAGNOSIS — R07.9 CHEST PAIN OF UNCERTAIN ETIOLOGY: Primary | ICD-10-CM

## 2018-01-01 DIAGNOSIS — J44.9 CHRONIC OBSTRUCTIVE PULMONARY DISEASE, UNSPECIFIED COPD TYPE (HCC): ICD-10-CM

## 2018-01-01 DIAGNOSIS — C79.31 BRAIN METASTASIS (HCC): ICD-10-CM

## 2018-01-01 DIAGNOSIS — R07.9 CHEST PAIN OF UNCERTAIN ETIOLOGY: ICD-10-CM

## 2018-01-01 DIAGNOSIS — C34.90 MALIGNANT NEOPLASM OF BRONCHUS AND LUNG (HCC): Primary | ICD-10-CM

## 2018-01-01 DIAGNOSIS — Z71.89 GOALS OF CARE, COUNSELING/DISCUSSION: ICD-10-CM

## 2018-01-01 DIAGNOSIS — R07.9 ACUTE CHEST PAIN: Primary | ICD-10-CM

## 2018-01-01 DIAGNOSIS — J18.9 COMMUNITY ACQUIRED PNEUMONIA, UNSPECIFIED LATERALITY: Primary | ICD-10-CM

## 2018-01-01 DIAGNOSIS — C34.12 MALIGNANT NEOPLASM OF UPPER LOBE OF LEFT LUNG (HCC): Primary | ICD-10-CM

## 2018-01-01 DIAGNOSIS — E11.9 CONTROLLED TYPE 2 DIABETES MELLITUS WITHOUT COMPLICATION, WITHOUT LONG-TERM CURRENT USE OF INSULIN (HCC): ICD-10-CM

## 2018-01-01 DIAGNOSIS — C34.90 NON-SMALL CELL LUNG CANCER, UNSPECIFIED LATERALITY (HCC): ICD-10-CM

## 2018-01-01 DIAGNOSIS — I48.20 CHRONIC ATRIAL FIBRILLATION (HCC): ICD-10-CM

## 2018-01-01 DIAGNOSIS — R13.19 OTHER DYSPHAGIA: ICD-10-CM

## 2018-01-01 DIAGNOSIS — J44.9 COPD, SEVERE (HCC): ICD-10-CM

## 2018-01-01 DIAGNOSIS — J96.11 CHRONIC HYPOXEMIC RESPIRATORY FAILURE (HCC): Primary | ICD-10-CM

## 2018-01-01 DIAGNOSIS — I10 ESSENTIAL HYPERTENSION, MALIGNANT: ICD-10-CM

## 2018-01-01 DIAGNOSIS — R53.1 WEAKNESS: ICD-10-CM

## 2018-01-01 DIAGNOSIS — R53.81 PHYSICAL DECONDITIONING: ICD-10-CM

## 2018-01-01 DIAGNOSIS — I10 ESSENTIAL HYPERTENSION: ICD-10-CM

## 2018-01-01 LAB
ADENOVIRUS PCR:: NEGATIVE
ADENOVIRUS PCR:: NEGATIVE
ALBUMIN SERPL-MCNC: 2.7 G/DL (ref 3.1–4.5)
ALBUMIN SERPL-MCNC: 2.9 G/DL (ref 3.5–4.8)
ALBUMIN SERPL-MCNC: 3.6 G/DL (ref 3.5–4.8)
ALBUMIN SERPL-MCNC: 3.8 G/DL (ref 3.5–4.8)
ALBUMIN/GLOB SERPL: 0.7 {RATIO} (ref 1–2)
ALBUMIN/GLOB SERPL: 1.2 {RATIO} (ref 1–2)
ALLENS TEST: POSITIVE
ALP LIVER SERPL-CCNC: 145 U/L (ref 55–142)
ALP LIVER SERPL-CCNC: 55 U/L (ref 55–142)
ALP LIVER SERPL-CCNC: 69 U/L (ref 55–142)
ALP LIVER SERPL-CCNC: 79 U/L (ref 55–142)
ALT SERPL-CCNC: 16 U/L (ref 14–54)
ALT SERPL-CCNC: 21 U/L (ref 14–54)
ALT SERPL-CCNC: 22 U/L (ref 14–54)
ALT SERPL-CCNC: 28 U/L (ref 14–54)
ANION GAP SERPL CALC-SCNC: 5 MMOL/L (ref 0–18)
ANION GAP SERPL CALC-SCNC: 6 MMOL/L (ref 0–18)
ANION GAP SERPL CALC-SCNC: 7 MMOL/L (ref 0–18)
ANION GAP SERPL CALC-SCNC: 8 MMOL/L (ref 0–18)
APTT PPP: 26.2 SECONDS (ref 26.1–34.6)
ARTERIAL BLD GAS O2 SATURATION: 93 % (ref 92–100)
ARTERIAL BLOOD GAS BASE EXCESS: 3.2
ARTERIAL BLOOD GAS HCO3: 29.3 MEQ/L (ref 22–26)
ARTERIAL BLOOD GAS PCO2: 51 MM HG (ref 35–45)
ARTERIAL BLOOD GAS PH: 7.38 (ref 7.35–7.45)
ARTERIAL BLOOD GAS PO2: 80 MM HG (ref 80–105)
AST SERPL-CCNC: 17 U/L (ref 15–41)
AST SERPL-CCNC: 20 U/L (ref 15–41)
AST SERPL-CCNC: 24 U/L (ref 15–41)
AST SERPL-CCNC: 30 U/L (ref 15–41)
ATRIAL RATE: 77 BPM
ATRIAL RATE: 82 BPM
ATRIAL RATE: 85 BPM
ATRIAL RATE: 85 BPM
B PERT DNA SPEC QL NAA+PROBE: NEGATIVE
B PERT DNA SPEC QL NAA+PROBE: NEGATIVE
BASOPHILS # BLD AUTO: 0.01 X10(3) UL (ref 0–0.1)
BASOPHILS # BLD AUTO: 0.02 X10(3) UL (ref 0–0.1)
BASOPHILS # BLD AUTO: 0.03 X10(3) UL (ref 0–0.1)
BASOPHILS # BLD AUTO: 0.06 X10(3) UL (ref 0–0.1)
BASOPHILS # BLD AUTO: 0.06 X10(3) UL (ref 0–0.1)
BASOPHILS NFR BLD AUTO: 0.1 %
BASOPHILS NFR BLD AUTO: 0.3 %
BASOPHILS NFR BLD AUTO: 0.9 %
BASOPHILS NFR BLD AUTO: 1 %
BASOPHILS NFR BLD AUTO: 1 %
BILIRUB SERPL-MCNC: 0.2 MG/DL (ref 0.1–2)
BILIRUB SERPL-MCNC: 0.3 MG/DL (ref 0.1–2)
BILIRUB SERPL-MCNC: 0.4 MG/DL (ref 0.1–2)
BILIRUB SERPL-MCNC: 0.4 MG/DL (ref 0.1–2)
BUN BLD-MCNC: 11 MG/DL (ref 8–20)
BUN BLD-MCNC: 12 MG/DL (ref 8–20)
BUN BLD-MCNC: 15 MG/DL (ref 8–20)
BUN BLD-MCNC: 15 MG/DL (ref 8–20)
BUN BLD-MCNC: 17 MG/DL (ref 8–20)
BUN BLD-MCNC: 22 MG/DL (ref 8–20)
BUN BLD-MCNC: 8 MG/DL (ref 8–20)
BUN/CREAT SERPL: 19.2 (ref 10–20)
BUN/CREAT SERPL: 22.4 (ref 10–20)
BUN/CREAT SERPL: 24.3 (ref 10–20)
BUN/CREAT SERPL: 28.6 (ref 10–20)
C PNEUM DNA SPEC QL NAA+PROBE: NEGATIVE
C PNEUM DNA SPEC QL NAA+PROBE: NEGATIVE
C-REACTIVE PROTEIN: <0.29 MG/DL (ref ?–1)
CALCIUM BLD-MCNC: 8.3 MG/DL (ref 8.3–10.3)
CALCIUM BLD-MCNC: 8.6 MG/DL (ref 8.3–10.3)
CALCIUM BLD-MCNC: 8.7 MG/DL (ref 8.3–10.3)
CALCIUM BLD-MCNC: 8.8 MG/DL (ref 8.3–10.3)
CALCIUM BLD-MCNC: 9.2 MG/DL (ref 8.3–10.3)
CALCIUM BLD-MCNC: 9.3 MG/DL (ref 8.3–10.3)
CALCIUM BLD-MCNC: 9.3 MG/DL (ref 8.3–10.3)
CALCULATED O2 SATURATION: 96 % (ref 92–100)
CARBOXYHEMOGLOBIN: 2.7 % SAT (ref 0–3)
CHLORIDE SERPL-SCNC: 103 MMOL/L (ref 101–111)
CHLORIDE SERPL-SCNC: 107 MMOL/L (ref 101–111)
CHLORIDE SERPL-SCNC: 91 MMOL/L (ref 101–111)
CHLORIDE SERPL-SCNC: 97 MMOL/L (ref 101–111)
CHLORIDE: 101 MMOL/L (ref 101–111)
CHLORIDE: 105 MMOL/L (ref 101–111)
CHLORIDE: 98 MMOL/L (ref 101–111)
CO2 SERPL-SCNC: 26 MMOL/L (ref 22–32)
CO2 SERPL-SCNC: 27 MMOL/L (ref 22–32)
CO2 SERPL-SCNC: 29 MMOL/L (ref 22–32)
CO2 SERPL-SCNC: 31 MMOL/L (ref 22–32)
CO2: 28 MMOL/L (ref 22–32)
CO2: 28 MMOL/L (ref 22–32)
CO2: 31 MMOL/L (ref 22–32)
CORONAVIRUS 229E PCR:: NEGATIVE
CORONAVIRUS 229E PCR:: NEGATIVE
CORONAVIRUS HKU1 PCR:: NEGATIVE
CORONAVIRUS HKU1 PCR:: NEGATIVE
CORONAVIRUS NL63 PCR:: NEGATIVE
CORONAVIRUS NL63 PCR:: NEGATIVE
CORONAVIRUS OC43 PCR:: NEGATIVE
CORONAVIRUS OC43 PCR:: NEGATIVE
CREAT BLD-MCNC: 0.48 MG/DL (ref 0.55–1.02)
CREAT BLD-MCNC: 0.54 MG/DL (ref 0.55–1.02)
CREAT BLD-MCNC: 0.67 MG/DL (ref 0.55–1.02)
CREAT BLD-MCNC: 0.7 MG/DL (ref 0.55–1.02)
CREAT BLD-MCNC: 0.7 MG/DL (ref 0.55–1.02)
CREAT BLD-MCNC: 0.77 MG/DL (ref 0.55–1.02)
CREAT BLD-MCNC: 0.78 MG/DL (ref 0.55–1.02)
D-DIMER: 0.53 UG/ML FEU (ref 0–0.49)
EOSINOPHIL # BLD AUTO: 0.2 X10(3) UL (ref 0–0.3)
EOSINOPHIL # BLD AUTO: 0.2 X10(3) UL (ref 0–0.3)
EOSINOPHIL # BLD AUTO: 0.21 X10(3) UL (ref 0–0.3)
EOSINOPHIL # BLD AUTO: 0.48 X10(3) UL (ref 0–0.3)
EOSINOPHIL # BLD AUTO: 0.53 X10(3) UL (ref 0–0.3)
EOSINOPHIL NFR BLD AUTO: 14.1 %
EOSINOPHIL NFR BLD AUTO: 2.4 %
EOSINOPHIL NFR BLD AUTO: 2.8 %
EOSINOPHIL NFR BLD AUTO: 3.3 %
EOSINOPHIL NFR BLD AUTO: 8.7 %
ERYTHROCYTE [DISTWIDTH] IN BLOOD BY AUTOMATED COUNT: 14.2 % (ref 11.5–16)
ERYTHROCYTE [DISTWIDTH] IN BLOOD BY AUTOMATED COUNT: 14.7 % (ref 11.5–16)
ERYTHROCYTE [DISTWIDTH] IN BLOOD BY AUTOMATED COUNT: 14.9 % (ref 11.5–16)
ERYTHROCYTE [DISTWIDTH] IN BLOOD BY AUTOMATED COUNT: 15 % (ref 11.5–16)
ERYTHROCYTE [DISTWIDTH] IN BLOOD BY AUTOMATED COUNT: 15.1 % (ref 11.5–16)
EST. AVERAGE GLUCOSE BLD GHB EST-MCNC: 126 MG/DL (ref 68–126)
FLUAV RNA SPEC QL NAA+PROBE: NEGATIVE
FLUAV RNA SPEC QL NAA+PROBE: NEGATIVE
FLUBV RNA SPEC QL NAA+PROBE: NEGATIVE
FLUBV RNA SPEC QL NAA+PROBE: NEGATIVE
GLOBULIN PLAS-MCNC: 3.2 G/DL (ref 2.5–3.7)
GLOBULIN PLAS-MCNC: 3.9 G/DL (ref 2.8–4.4)
GLUCOSE BLD-MCNC: 100 MG/DL (ref 65–99)
GLUCOSE BLD-MCNC: 103 MG/DL (ref 65–99)
GLUCOSE BLD-MCNC: 114 MG/DL (ref 65–99)
GLUCOSE BLD-MCNC: 117 MG/DL (ref 70–99)
GLUCOSE BLD-MCNC: 122 MG/DL (ref 65–99)
GLUCOSE BLD-MCNC: 136 MG/DL (ref 65–99)
GLUCOSE BLD-MCNC: 138 MG/DL (ref 65–99)
GLUCOSE BLD-MCNC: 144 MG/DL (ref 70–99)
GLUCOSE BLD-MCNC: 214 MG/DL (ref 65–99)
GLUCOSE BLD-MCNC: 52 MG/DL (ref 70–99)
GLUCOSE BLD-MCNC: 88 MG/DL (ref 70–99)
GLUCOSE BLD-MCNC: 89 MG/DL (ref 70–99)
GLUCOSE BLD-MCNC: 90 MG/DL (ref 70–99)
GLUCOSE BLD-MCNC: 93 MG/DL (ref 70–99)
HAV IGM SER QL: 1.8 MG/DL (ref 1.8–2.5)
HAV IGM SER QL: 1.9 MG/DL (ref 1.8–2.5)
HBA1C MFR BLD HPLC: 6 % (ref ?–5.7)
HCT VFR BLD AUTO: 35 % (ref 34–50)
HCT VFR BLD AUTO: 35 % (ref 34–50)
HCT VFR BLD AUTO: 37.8 % (ref 34–50)
HCT VFR BLD AUTO: 38.5 % (ref 34–50)
HCT VFR BLD AUTO: 39.3 % (ref 34–50)
HCT VFR BLD AUTO: 39.4 % (ref 34–50)
HCT VFR BLD AUTO: 42.3 % (ref 34–50)
HGB BLD-MCNC: 11.4 G/DL (ref 12–16)
HGB BLD-MCNC: 11.6 G/DL (ref 12–16)
HGB BLD-MCNC: 12.5 G/DL (ref 12–16)
HGB BLD-MCNC: 12.7 G/DL (ref 12–16)
HGB BLD-MCNC: 12.9 G/DL (ref 12–16)
HGB BLD-MCNC: 13.2 G/DL (ref 12–16)
HGB BLD-MCNC: 13.8 G/DL (ref 12–16)
IMMATURE GRANULOCYTE COUNT: 0.01 X10(3) UL (ref 0–1)
IMMATURE GRANULOCYTE COUNT: 0.01 X10(3) UL (ref 0–1)
IMMATURE GRANULOCYTE COUNT: 0.02 X10(3) UL (ref 0–1)
IMMATURE GRANULOCYTE COUNT: 0.03 X10(3) UL (ref 0–1)
IMMATURE GRANULOCYTE COUNT: 0.04 X10(3) UL (ref 0–1)
IMMATURE GRANULOCYTE RATIO %: 0.2 %
IMMATURE GRANULOCYTE RATIO %: 0.3 %
IMMATURE GRANULOCYTE RATIO %: 0.3 %
IMMATURE GRANULOCYTE RATIO %: 0.4 %
IMMATURE GRANULOCYTE RATIO %: 0.5 %
INR BLD: 0.97 (ref 0.9–1.1)
L/M: 2.5 L/MIN
LACTIC ACID: 0.9 MMOL/L (ref 0.5–2)
LIPASE: 112 U/L (ref 73–393)
LYMPHOCYTES # BLD AUTO: 0.53 X10(3) UL (ref 0.9–4)
LYMPHOCYTES # BLD AUTO: 0.58 X10(3) UL (ref 0.9–4)
LYMPHOCYTES # BLD AUTO: 0.83 X10(3) UL (ref 0.9–4)
LYMPHOCYTES # BLD AUTO: 0.87 X10(3) UL (ref 0.9–4)
LYMPHOCYTES # BLD AUTO: 1 X10(3) UL (ref 0.9–4)
LYMPHOCYTES NFR BLD AUTO: 10.5 %
LYMPHOCYTES NFR BLD AUTO: 11.8 %
LYMPHOCYTES NFR BLD AUTO: 15.5 %
LYMPHOCYTES NFR BLD AUTO: 16.4 %
LYMPHOCYTES NFR BLD AUTO: 9.3 %
M PROTEIN MFR SERPL ELPH: 5.7 G/DL (ref 6.1–8.3)
M PROTEIN MFR SERPL ELPH: 6.6 G/DL (ref 6.4–8.2)
M PROTEIN MFR SERPL ELPH: 6.9 G/DL (ref 6.1–8.3)
M PROTEIN MFR SERPL ELPH: 7 G/DL (ref 6.1–8.3)
MCH RBC QN AUTO: 28.2 PG (ref 27–33.2)
MCH RBC QN AUTO: 28.5 PG (ref 27–33.2)
MCH RBC QN AUTO: 28.6 PG (ref 27–33.2)
MCH RBC QN AUTO: 28.7 PG (ref 27–33.2)
MCH RBC QN AUTO: 28.7 PG (ref 27–33.2)
MCH RBC QN AUTO: 28.8 PG (ref 27–33.2)
MCH RBC QN AUTO: 29.1 PG (ref 27–33.2)
MCHC RBC AUTO-ENTMCNC: 32.6 G/DL (ref 31–37)
MCHC RBC AUTO-ENTMCNC: 32.6 G/DL (ref 31–37)
MCHC RBC AUTO-ENTMCNC: 32.7 G/DL (ref 31–37)
MCHC RBC AUTO-ENTMCNC: 33 G/DL (ref 31–37)
MCHC RBC AUTO-ENTMCNC: 33.1 G/DL (ref 31–37)
MCHC RBC AUTO-ENTMCNC: 33.1 G/DL (ref 31–37)
MCHC RBC AUTO-ENTMCNC: 33.6 G/DL (ref 31–37)
MCV RBC AUTO: 86 FL (ref 81–100)
MCV RBC AUTO: 86.6 FL (ref 81–100)
MCV RBC AUTO: 86.8 FL (ref 81–100)
MCV RBC AUTO: 86.9 FL (ref 81–100)
MCV RBC AUTO: 87.1 FL (ref 81–100)
MCV RBC AUTO: 87.2 FL (ref 81–100)
MCV RBC AUTO: 87.7 FL (ref 81–100)
METAPNEUMOVIRUS PCR:: NEGATIVE
METAPNEUMOVIRUS PCR:: NEGATIVE
METHEMOGLOBIN: 0.5 % SAT (ref 0.4–1.5)
MONOCYTES # BLD AUTO: 0.48 X10(3) UL (ref 0.1–1)
MONOCYTES # BLD AUTO: 0.65 X10(3) UL (ref 0.1–1)
MONOCYTES # BLD AUTO: 0.77 X10(3) UL (ref 0.1–1)
MONOCYTES # BLD AUTO: 0.84 X10(3) UL (ref 0.1–1)
MONOCYTES # BLD AUTO: 0.87 X10(3) UL (ref 0.1–1)
MONOCYTES NFR BLD AUTO: 10.1 %
MONOCYTES NFR BLD AUTO: 10.4 %
MONOCYTES NFR BLD AUTO: 11 %
MONOCYTES NFR BLD AUTO: 14.1 %
MONOCYTES NFR BLD AUTO: 14.3 %
MYCOPLASMA PNEUMONIA PCR:: NEGATIVE
MYCOPLASMA PNEUMONIA PCR:: NEGATIVE
NEUTROPHIL ABS PRELIM: 1.88 X10 (3) UL (ref 1.3–6.7)
NEUTROPHIL ABS PRELIM: 3.63 X10 (3) UL (ref 1.3–6.7)
NEUTROPHIL ABS PRELIM: 4.75 X10 (3) UL (ref 1.3–6.7)
NEUTROPHIL ABS PRELIM: 5.18 X10 (3) UL (ref 1.3–6.7)
NEUTROPHIL ABS PRELIM: 6.32 X10 (3) UL (ref 1.3–6.7)
NEUTROPHILS # BLD AUTO: 1.88 X10(3) UL (ref 1.3–6.7)
NEUTROPHILS # BLD AUTO: 3.63 X10(3) UL (ref 1.3–6.7)
NEUTROPHILS # BLD AUTO: 4.75 X10(3) UL (ref 1.3–6.7)
NEUTROPHILS # BLD AUTO: 5.18 X10(3) UL (ref 1.3–6.7)
NEUTROPHILS # BLD AUTO: 6.32 X10(3) UL (ref 1.3–6.7)
NEUTROPHILS NFR BLD AUTO: 55.1 %
NEUTROPHILS NFR BLD AUTO: 59.4 %
NEUTROPHILS NFR BLD AUTO: 73.7 %
NEUTROPHILS NFR BLD AUTO: 75.7 %
NEUTROPHILS NFR BLD AUTO: 76.4 %
OSMOLALITY SERPL CALC.SUM OF ELEC: 270 MOSM/KG (ref 275–295)
OSMOLALITY SERPL CALC.SUM OF ELEC: 275 MOSM/KG (ref 275–295)
OSMOLALITY SERPL CALC.SUM OF ELEC: 289 MOSM/KG (ref 275–295)
OSMOLALITY SERPL CALC.SUM OF ELEC: 289 MOSM/KG (ref 275–295)
P AXIS: 74 DEGREES
P AXIS: 76 DEGREES
P AXIS: 78 DEGREES
P AXIS: 79 DEGREES
P-R INTERVAL: 150 MS
P-R INTERVAL: 160 MS
P-R INTERVAL: 164 MS
P-R INTERVAL: 172 MS
P/F RATIO: 379.5 MMHG
PARAINFLUENZA 1 PCR:: NEGATIVE
PARAINFLUENZA 1 PCR:: NEGATIVE
PARAINFLUENZA 2 PCR:: NEGATIVE
PARAINFLUENZA 2 PCR:: NEGATIVE
PARAINFLUENZA 3 PCR:: NEGATIVE
PARAINFLUENZA 3 PCR:: NEGATIVE
PARAINFLUENZA 4 PCR:: NEGATIVE
PARAINFLUENZA 4 PCR:: NEGATIVE
PATIENT TEMPERATURE: 98.6 F
PLATELET # BLD AUTO: 204 10(3)UL (ref 150–450)
PLATELET # BLD AUTO: 208 10(3)UL (ref 150–450)
PLATELET # BLD AUTO: 273 10(3)UL (ref 150–450)
PLATELET # BLD AUTO: 274 10(3)UL (ref 150–450)
PLATELET # BLD AUTO: 280 10(3)UL (ref 150–450)
PLATELET # BLD AUTO: 289 10(3)UL (ref 150–450)
PLATELET # BLD AUTO: 323 10(3)UL (ref 150–450)
POTASSIUM SERPL-SCNC: 3.9 MMOL/L (ref 3.6–5.1)
POTASSIUM SERPL-SCNC: 4 MMOL/L (ref 3.6–5.1)
POTASSIUM SERPL-SCNC: 4.1 MMOL/L (ref 3.6–5.1)
POTASSIUM SERPL-SCNC: 4.3 MMOL/L (ref 3.6–5.1)
POTASSIUM SERPL-SCNC: 4.4 MMOL/L (ref 3.6–5.1)
POTASSIUM SERPL-SCNC: 4.4 MMOL/L (ref 3.6–5.1)
POTASSIUM SERPL-SCNC: 4.5 MMOL/L (ref 3.6–5.1)
PRO-BETA NATRIURETIC PEPTIDE: 242 PG/ML (ref ?–450)
PROCALCITONIN SERPL-MCNC: <0.11 NG/ML
PSA SERPL DL<=0.01 NG/ML-MCNC: 12.7 SECONDS (ref 12.4–14.7)
Q-T INTERVAL: 346 MS
Q-T INTERVAL: 348 MS
Q-T INTERVAL: 352 MS
Q-T INTERVAL: 390 MS
QRS DURATION: 70 MS
QRS DURATION: 74 MS
QTC CALCULATION (BEZET): 406 MS
QTC CALCULATION (BEZET): 411 MS
QTC CALCULATION (BEZET): 418 MS
QTC CALCULATION (BEZET): 441 MS
R AXIS: 51 DEGREES
R AXIS: 53 DEGREES
R AXIS: 58 DEGREES
R AXIS: 60 DEGREES
RBC # BLD AUTO: 3.99 X10(6)UL (ref 3.8–5.1)
RBC # BLD AUTO: 4.04 X10(6)UL (ref 3.8–5.1)
RBC # BLD AUTO: 4.34 X10(6)UL (ref 3.8–5.1)
RBC # BLD AUTO: 4.43 X10(6)UL (ref 3.8–5.1)
RBC # BLD AUTO: 4.53 X10(6)UL (ref 3.8–5.1)
RBC # BLD AUTO: 4.58 X10(6)UL (ref 3.8–5.1)
RBC # BLD AUTO: 4.85 X10(6)UL (ref 3.8–5.1)
RED CELL DISTRIBUTION WIDTH-SD: 45.2 FL (ref 35.1–46.3)
RED CELL DISTRIBUTION WIDTH-SD: 45.3 FL (ref 35.1–46.3)
RED CELL DISTRIBUTION WIDTH-SD: 45.9 FL (ref 35.1–46.3)
RED CELL DISTRIBUTION WIDTH-SD: 46.7 FL (ref 35.1–46.3)
RED CELL DISTRIBUTION WIDTH-SD: 47.5 FL (ref 35.1–46.3)
RED CELL DISTRIBUTION WIDTH-SD: 48 FL (ref 35.1–46.3)
RED CELL DISTRIBUTION WIDTH-SD: 48.2 FL (ref 35.1–46.3)
RHINOVIRUS/ENTERO PCR:: NEGATIVE
RHINOVIRUS/ENTERO PCR:: NEGATIVE
RSV RNA SPEC QL NAA+PROBE: NEGATIVE
RSV RNA SPEC QL NAA+PROBE: NEGATIVE
SED RATE-ML: 6 MM/HR (ref 0–25)
SODIUM SERPL-SCNC: 129 MMOL/L (ref 136–144)
SODIUM SERPL-SCNC: 131 MMOL/L (ref 136–144)
SODIUM SERPL-SCNC: 134 MMOL/L (ref 136–144)
SODIUM SERPL-SCNC: 134 MMOL/L (ref 136–144)
SODIUM SERPL-SCNC: 137 MMOL/L (ref 136–144)
SODIUM SERPL-SCNC: 138 MMOL/L (ref 136–144)
SODIUM SERPL-SCNC: 139 MMOL/L (ref 136–144)
T AXIS: 56 DEGREES
T AXIS: 61 DEGREES
T AXIS: 65 DEGREES
T AXIS: 66 DEGREES
TOTAL HEMOGLOBIN: 12.8 G/DL (ref 11.7–16)
TROPONIN I SERPL-MCNC: <0.046 NG/ML (ref ?–0.05)
TROPONIN: <0.046 NG/ML (ref ?–0.05)
VENTRICULAR RATE: 77 BPM
VENTRICULAR RATE: 82 BPM
VENTRICULAR RATE: 85 BPM
VENTRICULAR RATE: 85 BPM
WBC # BLD AUTO: 2.1 X10(3) UL (ref 4–13)
WBC # BLD AUTO: 3.4 X10(3) UL (ref 4–13)
WBC # BLD AUTO: 5.1 X10(3) UL (ref 4–13)
WBC # BLD AUTO: 6.1 X10(3) UL (ref 4–13)
WBC # BLD AUTO: 6.3 X10(3) UL (ref 4–13)
WBC # BLD AUTO: 7 X10(3) UL (ref 4–13)
WBC # BLD AUTO: 8.3 X10(3) UL (ref 4–13)

## 2018-01-01 PROCEDURE — 71045 X-RAY EXAM CHEST 1 VIEW: CPT | Performed by: INTERNAL MEDICINE

## 2018-01-01 PROCEDURE — 82962 GLUCOSE BLOOD TEST: CPT

## 2018-01-01 PROCEDURE — 83735 ASSAY OF MAGNESIUM: CPT

## 2018-01-01 PROCEDURE — 80053 COMPREHEN METABOLIC PANEL: CPT

## 2018-01-01 PROCEDURE — 93306 TTE W/DOPPLER COMPLETE: CPT | Performed by: HOSPITALIST

## 2018-01-01 PROCEDURE — 88341 IMHCHEM/IMCYTCHM EA ADD ANTB: CPT | Performed by: INTERNAL MEDICINE

## 2018-01-01 PROCEDURE — 87086 URINE CULTURE/COLONY COUNT: CPT | Performed by: PHYSICIAN ASSISTANT

## 2018-01-01 PROCEDURE — 99222 1ST HOSP IP/OBS MODERATE 55: CPT | Performed by: CLINICAL NURSE SPECIALIST

## 2018-01-01 PROCEDURE — 77336 RADIATION PHYSICS CONSULT: CPT | Performed by: RADIOLOGY

## 2018-01-01 PROCEDURE — 77386 HC IMRT COMPLEX: CPT | Performed by: RADIOLOGY

## 2018-01-01 PROCEDURE — 99232 SBSQ HOSP IP/OBS MODERATE 35: CPT | Performed by: HOSPITALIST

## 2018-01-01 PROCEDURE — 77399 UNLISTED PX MED RADJ PHYSICS: CPT | Performed by: RADIOLOGY

## 2018-01-01 PROCEDURE — 99223 1ST HOSP IP/OBS HIGH 75: CPT | Performed by: INTERNAL MEDICINE

## 2018-01-01 PROCEDURE — 88360 TUMOR IMMUNOHISTOCHEM/MANUAL: CPT | Performed by: INTERNAL MEDICINE

## 2018-01-01 PROCEDURE — 07978ZX DRAINAGE OF THORAX LYMPHATIC, VIA NATURAL OR ARTIFICIAL OPENING ENDOSCOPIC APPROACH, DIAGNOSTIC: ICD-10-PCS | Performed by: INTERNAL MEDICINE

## 2018-01-01 PROCEDURE — 99225 SUBSEQUENT OBSERVATION CARE: CPT | Performed by: INTERNAL MEDICINE

## 2018-01-01 PROCEDURE — 88342 IMHCHEM/IMCYTCHM 1ST ANTB: CPT | Performed by: INTERNAL MEDICINE

## 2018-01-01 PROCEDURE — 77334 RADIATION TREATMENT AID(S): CPT | Performed by: RADIOLOGY

## 2018-01-01 PROCEDURE — 70450 CT HEAD/BRAIN W/O DYE: CPT | Performed by: EMERGENCY MEDICINE

## 2018-01-01 PROCEDURE — 99214 OFFICE O/P EST MOD 30 MIN: CPT

## 2018-01-01 PROCEDURE — 99232 SBSQ HOSP IP/OBS MODERATE 35: CPT | Performed by: STUDENT IN AN ORGANIZED HEALTH CARE EDUCATION/TRAINING PROGRAM

## 2018-01-01 PROCEDURE — 99217 OBSERVATION CARE DISCHARGE: CPT | Performed by: INTERNAL MEDICINE

## 2018-01-01 PROCEDURE — 85025 COMPLETE CBC W/AUTO DIFF WBC: CPT

## 2018-01-01 PROCEDURE — 99232 SBSQ HOSP IP/OBS MODERATE 35: CPT | Performed by: CLINICAL NURSE SPECIALIST

## 2018-01-01 PROCEDURE — 88172 CYTP DX EVAL FNA 1ST EA SITE: CPT | Performed by: INTERNAL MEDICINE

## 2018-01-01 PROCEDURE — 77470 SPECIAL RADIATION TREATMENT: CPT | Performed by: RADIOLOGY

## 2018-01-01 PROCEDURE — 77300 RADIATION THERAPY DOSE PLAN: CPT | Performed by: RADIOLOGY

## 2018-01-01 PROCEDURE — 99220 INITIAL OBSERVATION CARE,LEVL III: CPT | Performed by: HOSPITALIST

## 2018-01-01 PROCEDURE — 88381 MICRODISSECTION MANUAL: CPT | Performed by: INTERNAL MEDICINE

## 2018-01-01 PROCEDURE — 71275 CT ANGIOGRAPHY CHEST: CPT | Performed by: EMERGENCY MEDICINE

## 2018-01-01 PROCEDURE — 74230 X-RAY XM SWLNG FUNCJ C+: CPT | Performed by: INTERNAL MEDICINE

## 2018-01-01 PROCEDURE — 77338 DESIGN MLC DEVICE FOR IMRT: CPT | Performed by: RADIOLOGY

## 2018-01-01 PROCEDURE — 77301 RADIOTHERAPY DOSE PLAN IMRT: CPT | Performed by: RADIOLOGY

## 2018-01-01 PROCEDURE — 99213 OFFICE O/P EST LOW 20 MIN: CPT

## 2018-01-01 PROCEDURE — 99291 CRITICAL CARE FIRST HOUR: CPT | Performed by: OTHER

## 2018-01-01 PROCEDURE — 87186 SC STD MICRODIL/AGAR DIL: CPT | Performed by: PHYSICIAN ASSISTANT

## 2018-01-01 PROCEDURE — 99239 HOSP IP/OBS DSCHRG MGMT >30: CPT | Performed by: STUDENT IN AN ORGANIZED HEALTH CARE EDUCATION/TRAINING PROGRAM

## 2018-01-01 PROCEDURE — 99223 1ST HOSP IP/OBS HIGH 75: CPT | Performed by: HOSPITALIST

## 2018-01-01 PROCEDURE — 71045 X-RAY EXAM CHEST 1 VIEW: CPT | Performed by: EMERGENCY MEDICINE

## 2018-01-01 PROCEDURE — 77373 STRTCTC BDY RAD THER TX DLVR: CPT | Performed by: RADIOLOGY

## 2018-01-01 PROCEDURE — 81210 BRAF GENE: CPT | Performed by: INTERNAL MEDICINE

## 2018-01-01 PROCEDURE — 74177 CT ABD & PELVIS W/CONTRAST: CPT | Performed by: EMERGENCY MEDICINE

## 2018-01-01 PROCEDURE — 71045 X-RAY EXAM CHEST 1 VIEW: CPT | Performed by: STUDENT IN AN ORGANIZED HEALTH CARE EDUCATION/TRAINING PROGRAM

## 2018-01-01 PROCEDURE — 78815 PET IMAGE W/CT SKULL-THIGH: CPT | Performed by: INTERNAL MEDICINE

## 2018-01-01 PROCEDURE — 94640 AIRWAY INHALATION TREATMENT: CPT

## 2018-01-01 PROCEDURE — 99306 1ST NF CARE HIGH MDM 50: CPT | Performed by: FAMILY MEDICINE

## 2018-01-01 PROCEDURE — 87088 URINE BACTERIA CULTURE: CPT | Performed by: PHYSICIAN ASSISTANT

## 2018-01-01 PROCEDURE — 87077 CULTURE AEROBIC IDENTIFY: CPT | Performed by: PHYSICIAN ASSISTANT

## 2018-01-01 PROCEDURE — 99220 INITIAL OBSERVATION CARE,LEVL III: CPT | Performed by: INTERNAL MEDICINE

## 2018-01-01 PROCEDURE — 88177 CYTP FNA EVAL EA ADDL: CPT | Performed by: INTERNAL MEDICINE

## 2018-01-01 PROCEDURE — 77293 RESPIRATOR MOTION MGMT SIMUL: CPT | Performed by: RADIOLOGY

## 2018-01-01 PROCEDURE — 70553 MRI BRAIN STEM W/O & W/DYE: CPT | Performed by: HOSPITALIST

## 2018-01-01 PROCEDURE — 88305 TISSUE EXAM BY PATHOLOGIST: CPT | Performed by: INTERNAL MEDICINE

## 2018-01-01 PROCEDURE — 99215 OFFICE O/P EST HI 40 MIN: CPT | Performed by: FAMILY MEDICINE

## 2018-01-01 PROCEDURE — 81235 EGFR GENE COM VARIANTS: CPT | Performed by: INTERNAL MEDICINE

## 2018-01-01 PROCEDURE — 99233 SBSQ HOSP IP/OBS HIGH 50: CPT | Performed by: OTHER

## 2018-01-01 PROCEDURE — 99239 HOSP IP/OBS DSCHRG MGMT >30: CPT | Performed by: HOSPITALIST

## 2018-01-01 PROCEDURE — 99221 1ST HOSP IP/OBS SF/LOW 40: CPT | Performed by: NEUROLOGICAL SURGERY

## 2018-01-01 PROCEDURE — 71045 X-RAY EXAM CHEST 1 VIEW: CPT

## 2018-01-01 PROCEDURE — 88173 CYTOPATH EVAL FNA REPORT: CPT | Performed by: INTERNAL MEDICINE

## 2018-01-01 PROCEDURE — 82306 VITAMIN D 25 HYDROXY: CPT

## 2018-01-01 PROCEDURE — 99309 SBSQ NF CARE MODERATE MDM 30: CPT | Performed by: NURSE PRACTITIONER

## 2018-01-01 PROCEDURE — 71260 CT THORAX DX C+: CPT | Performed by: INTERNAL MEDICINE

## 2018-01-01 PROCEDURE — 99232 SBSQ HOSP IP/OBS MODERATE 35: CPT | Performed by: OTHER

## 2018-01-01 RX ORDER — PREDNISONE 20 MG/1
TABLET ORAL
Qty: 30 TABLET | Refills: 0 | Status: SHIPPED | OUTPATIENT
Start: 2018-01-01 | End: 2018-01-01 | Stop reason: ALTCHOICE

## 2018-01-01 RX ORDER — FAMOTIDINE 10 MG/ML
20 INJECTION, SOLUTION INTRAVENOUS DAILY
Status: DISCONTINUED | OUTPATIENT
Start: 2018-01-01 | End: 2018-01-01

## 2018-01-01 RX ORDER — ALBUTEROL SULFATE 90 UG/1
2 AEROSOL, METERED RESPIRATORY (INHALATION) EVERY 4 HOURS PRN
Status: DISCONTINUED | OUTPATIENT
Start: 2018-01-01 | End: 2018-01-01

## 2018-01-01 RX ORDER — LOSARTAN POTASSIUM 100 MG/1
TABLET ORAL
Qty: 30 TABLET | Refills: 1 | Status: SHIPPED | OUTPATIENT
Start: 2018-01-01 | End: 2018-01-01 | Stop reason: CLARIF

## 2018-01-01 RX ORDER — GUAIFENESIN 600 MG
600 TABLET, EXTENDED RELEASE 12 HR ORAL 2 TIMES DAILY
Status: DISCONTINUED | OUTPATIENT
Start: 2018-01-01 | End: 2018-01-01

## 2018-01-01 RX ORDER — NICOTINE 21 MG/24HR
1 PATCH, TRANSDERMAL 24 HOURS TRANSDERMAL DAILY
Status: DISCONTINUED | OUTPATIENT
Start: 2018-01-01 | End: 2018-01-01

## 2018-01-01 RX ORDER — ACETAMINOPHEN 325 MG/1
650 TABLET ORAL EVERY 6 HOURS PRN
Status: DISCONTINUED | OUTPATIENT
Start: 2018-01-01 | End: 2018-01-01

## 2018-01-01 RX ORDER — LOSARTAN POTASSIUM 100 MG/1
TABLET ORAL
Qty: 30 TABLET | Refills: 0 | OUTPATIENT
Start: 2018-01-01

## 2018-01-01 RX ORDER — METOCLOPRAMIDE HYDROCHLORIDE 5 MG/ML
10 INJECTION INTRAMUSCULAR; INTRAVENOUS EVERY 8 HOURS PRN
Status: DISCONTINUED | OUTPATIENT
Start: 2018-01-01 | End: 2018-01-01

## 2018-01-01 RX ORDER — ASPIRIN 81 MG/1
324 TABLET, CHEWABLE ORAL ONCE
Status: DISCONTINUED | OUTPATIENT
Start: 2018-01-01 | End: 2018-01-01

## 2018-01-01 RX ORDER — IPRATROPIUM BROMIDE AND ALBUTEROL SULFATE 2.5; .5 MG/3ML; MG/3ML
SOLUTION RESPIRATORY (INHALATION)
Status: DISCONTINUED
Start: 2018-01-01 | End: 2018-01-01

## 2018-01-01 RX ORDER — ASPIRIN 81 MG/1
81 TABLET ORAL DAILY
Status: DISCONTINUED | OUTPATIENT
Start: 2018-01-01 | End: 2018-01-01

## 2018-01-01 RX ORDER — IPRATROPIUM BROMIDE AND ALBUTEROL SULFATE 2.5; .5 MG/3ML; MG/3ML
3 SOLUTION RESPIRATORY (INHALATION) EVERY 4 HOURS PRN
Status: DISCONTINUED | OUTPATIENT
Start: 2018-01-01 | End: 2018-01-01

## 2018-01-01 RX ORDER — FAMOTIDINE 20 MG/1
20 TABLET ORAL DAILY
Status: DISCONTINUED | OUTPATIENT
Start: 2018-01-01 | End: 2018-01-01

## 2018-01-01 RX ORDER — IPRATROPIUM BROMIDE AND ALBUTEROL SULFATE 2.5; .5 MG/3ML; MG/3ML
3 SOLUTION RESPIRATORY (INHALATION) ONCE
Status: COMPLETED | OUTPATIENT
Start: 2018-01-01 | End: 2018-01-01

## 2018-01-01 RX ORDER — DEXTROSE MONOHYDRATE 25 G/50ML
50 INJECTION, SOLUTION INTRAVENOUS
Status: DISCONTINUED | OUTPATIENT
Start: 2018-01-01 | End: 2018-01-01

## 2018-01-01 RX ORDER — DEXAMETHASONE 2 MG/1
2 TABLET ORAL EVERY 12 HOURS SCHEDULED
Status: DISCONTINUED | OUTPATIENT
Start: 2018-01-01 | End: 2018-01-01

## 2018-01-01 RX ORDER — LEVOFLOXACIN 500 MG/1
500 TABLET, FILM COATED ORAL DAILY
Qty: 3 TABLET | Refills: 0 | Status: SHIPPED | OUTPATIENT
Start: 2018-01-01 | End: 2018-01-01

## 2018-01-01 RX ORDER — METOCLOPRAMIDE HYDROCHLORIDE 5 MG/ML
5 INJECTION INTRAMUSCULAR; INTRAVENOUS EVERY 8 HOURS PRN
Status: DISCONTINUED | OUTPATIENT
Start: 2018-01-01 | End: 2018-01-01

## 2018-01-01 RX ORDER — PANTOPRAZOLE SODIUM 40 MG/1
40 TABLET, DELAYED RELEASE ORAL
COMMUNITY
End: 2018-01-01

## 2018-01-01 RX ORDER — HEPARIN SODIUM 5000 [USP'U]/ML
5000 INJECTION, SOLUTION INTRAVENOUS; SUBCUTANEOUS EVERY 8 HOURS SCHEDULED
Status: DISCONTINUED | OUTPATIENT
Start: 2018-01-01 | End: 2018-01-01

## 2018-01-01 RX ORDER — DEXTROSE MONOHYDRATE 25 G/50ML
50 INJECTION, SOLUTION INTRAVENOUS
Status: CANCELLED | OUTPATIENT
Start: 2018-01-01

## 2018-01-01 RX ORDER — IPRATROPIUM BROMIDE AND ALBUTEROL SULFATE 2.5; .5 MG/3ML; MG/3ML
3 SOLUTION RESPIRATORY (INHALATION) EVERY 6 HOURS PRN
Status: DISCONTINUED | OUTPATIENT
Start: 2018-01-01 | End: 2018-01-01

## 2018-01-01 RX ORDER — SODIUM CHLORIDE, SODIUM LACTATE, POTASSIUM CHLORIDE, CALCIUM CHLORIDE 600; 310; 30; 20 MG/100ML; MG/100ML; MG/100ML; MG/100ML
INJECTION, SOLUTION INTRAVENOUS CONTINUOUS
Status: DISCONTINUED | OUTPATIENT
Start: 2018-01-01 | End: 2018-01-01

## 2018-01-01 RX ORDER — METRONIDAZOLE 500 MG/100ML
500 INJECTION, SOLUTION INTRAVENOUS ONCE
Status: DISCONTINUED | OUTPATIENT
Start: 2018-01-01 | End: 2018-01-01 | Stop reason: ALTCHOICE

## 2018-01-01 RX ORDER — IPRATROPIUM BROMIDE AND ALBUTEROL SULFATE 2.5; .5 MG/3ML; MG/3ML
SOLUTION RESPIRATORY (INHALATION)
Status: DISPENSED
Start: 2018-01-01 | End: 2018-01-01

## 2018-01-01 RX ORDER — IPRATROPIUM BROMIDE AND ALBUTEROL SULFATE 2.5; .5 MG/3ML; MG/3ML
3 SOLUTION RESPIRATORY (INHALATION)
Status: DISCONTINUED | OUTPATIENT
Start: 2018-01-01 | End: 2018-01-01

## 2018-01-01 RX ORDER — SODIUM CHLORIDE 9 MG/ML
INJECTION, SOLUTION INTRAVENOUS CONTINUOUS
Status: DISCONTINUED | OUTPATIENT
Start: 2018-01-01 | End: 2018-01-01

## 2018-01-01 RX ORDER — DEXAMETHASONE SODIUM PHOSPHATE 4 MG/ML
4 VIAL (ML) INJECTION EVERY 8 HOURS
Status: DISCONTINUED | OUTPATIENT
Start: 2018-01-01 | End: 2018-01-01

## 2018-01-01 RX ORDER — DEXAMETHASONE 2 MG/1
2 TABLET ORAL 2 TIMES DAILY WITH MEALS
Qty: 60 TABLET | Refills: 1 | Status: SHIPPED | OUTPATIENT
Start: 2018-01-01 | End: 2018-01-01

## 2018-01-01 RX ORDER — ENOXAPARIN SODIUM 100 MG/ML
30 INJECTION SUBCUTANEOUS NIGHTLY
Status: DISCONTINUED | OUTPATIENT
Start: 2018-01-01 | End: 2018-01-01

## 2018-01-01 RX ORDER — NALOXONE HYDROCHLORIDE 0.4 MG/ML
80 INJECTION, SOLUTION INTRAMUSCULAR; INTRAVENOUS; SUBCUTANEOUS AS NEEDED
Status: CANCELLED | OUTPATIENT
Start: 2018-01-01 | End: 2018-01-01

## 2018-01-01 RX ORDER — DEXAMETHASONE SODIUM PHOSPHATE 4 MG/ML
10 VIAL (ML) INJECTION ONCE
Status: COMPLETED | OUTPATIENT
Start: 2018-01-01 | End: 2018-01-01

## 2018-01-01 RX ORDER — HYDRALAZINE HYDROCHLORIDE 20 MG/ML
10 INJECTION INTRAMUSCULAR; INTRAVENOUS EVERY 6 HOURS PRN
Status: DISCONTINUED | OUTPATIENT
Start: 2018-01-01 | End: 2018-01-01

## 2018-01-01 RX ORDER — LOSARTAN POTASSIUM 100 MG/1
100 TABLET ORAL DAILY
Status: DISCONTINUED | OUTPATIENT
Start: 2018-01-01 | End: 2018-01-01

## 2018-01-01 RX ORDER — ALPRAZOLAM 0.25 MG/1
0.25 TABLET ORAL AS NEEDED
Status: DISCONTINUED | OUTPATIENT
Start: 2018-01-01 | End: 2018-01-01

## 2018-01-01 RX ORDER — LOSARTAN POTASSIUM 100 MG/1
TABLET ORAL
Qty: 30 TABLET | Refills: 0 | Status: SHIPPED | OUTPATIENT
Start: 2018-01-01 | End: 2018-01-01

## 2018-01-01 RX ORDER — LOSARTAN POTASSIUM 100 MG/1
100 TABLET ORAL DAILY
COMMUNITY
End: 2018-01-01

## 2018-01-01 RX ORDER — PANTOPRAZOLE SODIUM 40 MG/1
40 TABLET, DELAYED RELEASE ORAL NIGHTLY
Qty: 30 TABLET | Refills: 0 | Status: SHIPPED | OUTPATIENT
Start: 2018-01-01 | End: 2018-01-01 | Stop reason: ALTCHOICE

## 2018-01-01 RX ORDER — LEVOFLOXACIN 500 MG/1
500 TABLET, FILM COATED ORAL ONCE
Status: COMPLETED | OUTPATIENT
Start: 2018-01-01 | End: 2018-01-01

## 2018-01-01 RX ORDER — IPRATROPIUM BROMIDE AND ALBUTEROL SULFATE 2.5; .5 MG/3ML; MG/3ML
3 SOLUTION RESPIRATORY (INHALATION) ONCE
Status: DISCONTINUED | OUTPATIENT
Start: 2018-01-01 | End: 2018-01-01

## 2018-01-01 RX ORDER — CALCIUM CARBONATE/VITAMIN D3 600 MG-10
1 TABLET ORAL DAILY
Status: ON HOLD | COMMUNITY
End: 2018-01-01

## 2018-01-01 RX ORDER — LOSARTAN POTASSIUM 100 MG/1
100 TABLET ORAL DAILY
Status: ON HOLD | COMMUNITY
End: 2018-01-01

## 2018-01-01 RX ORDER — MONTELUKAST SODIUM 10 MG/1
10 TABLET ORAL NIGHTLY
Status: DISCONTINUED | OUTPATIENT
Start: 2018-01-01 | End: 2018-01-01

## 2018-01-01 RX ORDER — ONDANSETRON 2 MG/ML
4 INJECTION INTRAMUSCULAR; INTRAVENOUS ONCE AS NEEDED
Status: DISCONTINUED | OUTPATIENT
Start: 2018-01-01 | End: 2018-01-01

## 2018-01-01 RX ORDER — ASPIRIN 81 MG/1
162 TABLET, CHEWABLE ORAL ONCE
Status: COMPLETED | OUTPATIENT
Start: 2018-01-01 | End: 2018-01-01

## 2018-01-01 RX ORDER — VITS A,C,E/LUTEIN/MINERALS 300MCG-200
2 TABLET ORAL DAILY
Status: DISCONTINUED | OUTPATIENT
Start: 2018-01-01 | End: 2018-01-01

## 2018-01-01 RX ORDER — MONTELUKAST SODIUM 10 MG/1
10 TABLET ORAL DAILY
Status: DISCONTINUED | OUTPATIENT
Start: 2018-01-01 | End: 2018-01-01

## 2018-01-01 RX ORDER — ONDANSETRON 2 MG/ML
4 INJECTION INTRAMUSCULAR; INTRAVENOUS AS NEEDED
Status: CANCELLED | OUTPATIENT
Start: 2018-01-01 | End: 2018-01-01

## 2018-01-01 RX ORDER — SODIUM CHLORIDE, SODIUM LACTATE, POTASSIUM CHLORIDE, CALCIUM CHLORIDE 600; 310; 30; 20 MG/100ML; MG/100ML; MG/100ML; MG/100ML
INJECTION, SOLUTION INTRAVENOUS CONTINUOUS
Status: CANCELLED | OUTPATIENT
Start: 2018-01-01

## 2018-01-01 RX ORDER — ALPRAZOLAM 0.25 MG/1
0.25 TABLET ORAL AS NEEDED
Qty: 3 TABLET | Refills: 0 | Status: ON HOLD | OUTPATIENT
Start: 2018-01-01 | End: 2018-01-01

## 2018-01-01 RX ORDER — ONDANSETRON 2 MG/ML
4 INJECTION INTRAMUSCULAR; INTRAVENOUS EVERY 6 HOURS PRN
Status: DISCONTINUED | OUTPATIENT
Start: 2018-01-01 | End: 2018-01-01

## 2018-01-01 RX ORDER — IPRATROPIUM BROMIDE AND ALBUTEROL SULFATE 2.5; .5 MG/3ML; MG/3ML
3 SOLUTION RESPIRATORY (INHALATION) AS NEEDED
Status: DISCONTINUED | OUTPATIENT
Start: 2018-01-01 | End: 2018-01-01

## 2018-01-01 RX ORDER — METHYLPREDNISOLONE SODIUM SUCCINATE 125 MG/2ML
125 INJECTION, POWDER, LYOPHILIZED, FOR SOLUTION INTRAMUSCULAR; INTRAVENOUS ONCE
Status: COMPLETED | OUTPATIENT
Start: 2018-01-01 | End: 2018-01-01

## 2018-01-01 RX ORDER — NALOXONE HYDROCHLORIDE 0.4 MG/ML
80 INJECTION, SOLUTION INTRAMUSCULAR; INTRAVENOUS; SUBCUTANEOUS AS NEEDED
Status: DISCONTINUED | OUTPATIENT
Start: 2018-01-01 | End: 2018-01-01

## 2018-01-01 RX ORDER — LABETALOL HYDROCHLORIDE 5 MG/ML
5 INJECTION, SOLUTION INTRAVENOUS EVERY 5 MIN PRN
Status: CANCELLED | OUTPATIENT
Start: 2018-01-01 | End: 2018-01-01

## 2018-01-01 RX ORDER — ALBUTEROL SULFATE 90 UG/1
2 AEROSOL, METERED RESPIRATORY (INHALATION) EVERY 4 HOURS PRN
COMMUNITY

## 2018-01-01 RX ORDER — LEVOFLOXACIN 500 MG/1
500 TABLET, FILM COATED ORAL DAILY
Qty: 2 TABLET | Refills: 0 | Status: SHIPPED | OUTPATIENT
Start: 2018-01-01 | End: 2019-01-01

## 2018-01-01 RX ORDER — FUROSEMIDE 20 MG/1
20 TABLET ORAL DAILY
Status: DISCONTINUED | OUTPATIENT
Start: 2018-01-01 | End: 2018-01-01

## 2018-01-01 RX ORDER — FUROSEMIDE 20 MG/1
20 TABLET ORAL ONCE
Status: COMPLETED | OUTPATIENT
Start: 2018-01-01 | End: 2018-01-01

## 2018-01-01 RX ORDER — MAGNESIUM OXIDE 400 MG (241.3 MG MAGNESIUM) TABLET
400 TABLET ONCE
Status: COMPLETED | OUTPATIENT
Start: 2018-01-01 | End: 2018-01-01

## 2018-01-01 RX ORDER — METHYLPREDNISOLONE SODIUM SUCCINATE 125 MG/2ML
80 INJECTION, POWDER, LYOPHILIZED, FOR SOLUTION INTRAMUSCULAR; INTRAVENOUS ONCE
Status: COMPLETED | OUTPATIENT
Start: 2018-01-01 | End: 2018-01-01

## 2018-01-01 RX ORDER — IPRATROPIUM BROMIDE AND ALBUTEROL SULFATE 2.5; .5 MG/3ML; MG/3ML
SOLUTION RESPIRATORY (INHALATION)
Status: DISCONTINUED | OUTPATIENT
Start: 2018-01-01 | End: 2018-01-01

## 2018-01-01 RX ORDER — IPRATROPIUM BROMIDE AND ALBUTEROL SULFATE 2.5; .5 MG/3ML; MG/3ML
SOLUTION RESPIRATORY (INHALATION)
Status: COMPLETED
Start: 2018-01-01 | End: 2018-01-01

## 2018-01-01 RX ORDER — DEXAMETHASONE SODIUM PHOSPHATE 4 MG/ML
4 VIAL (ML) INJECTION EVERY 6 HOURS
Status: DISCONTINUED | OUTPATIENT
Start: 2018-01-01 | End: 2018-01-01

## 2018-01-01 RX ORDER — FAMOTIDINE 20 MG/1
20 TABLET ORAL 2 TIMES DAILY
Qty: 60 TABLET | Refills: 1 | Status: ON HOLD | OUTPATIENT
Start: 2018-01-01 | End: 2018-01-01

## 2018-01-01 RX ORDER — TIOTROPIUM BROMIDE 18 UG/1
CAPSULE ORAL; RESPIRATORY (INHALATION)
Qty: 90 CAPSULE | Refills: 0 | Status: SHIPPED | OUTPATIENT
Start: 2018-01-01 | End: 2018-01-01

## 2018-01-01 RX ORDER — LEVOFLOXACIN 500 MG/1
500 TABLET, FILM COATED ORAL DAILY
Qty: 2 TABLET | Refills: 0 | Status: SHIPPED | OUTPATIENT
Start: 2018-01-01 | End: 2018-01-01

## 2018-01-01 RX ORDER — ENOXAPARIN SODIUM 100 MG/ML
40 INJECTION SUBCUTANEOUS DAILY
Status: DISCONTINUED | OUTPATIENT
Start: 2018-01-01 | End: 2018-01-01

## 2018-01-01 RX ORDER — HYDROMORPHONE HYDROCHLORIDE 1 MG/ML
0.2 INJECTION, SOLUTION INTRAMUSCULAR; INTRAVENOUS; SUBCUTANEOUS EVERY 5 MIN PRN
Status: CANCELLED | OUTPATIENT
Start: 2018-01-01 | End: 2018-01-01

## 2018-01-01 RX ORDER — TIOTROPIUM BROMIDE 18 UG/1
CAPSULE ORAL; RESPIRATORY (INHALATION)
Qty: 90 CAPSULE | Refills: 0 | Status: SHIPPED | OUTPATIENT
Start: 2018-01-01

## 2018-01-01 RX ORDER — LIDOCAINE HYDROCHLORIDE 20 MG/ML
INJECTION, SOLUTION INFILTRATION; PERINEURAL
Status: DISCONTINUED | OUTPATIENT
Start: 2018-01-01 | End: 2018-01-01

## 2018-01-01 RX ORDER — DIPHENHYDRAMINE HYDROCHLORIDE 50 MG/ML
12.5 INJECTION INTRAMUSCULAR; INTRAVENOUS AS NEEDED
Status: CANCELLED | OUTPATIENT
Start: 2018-01-01 | End: 2018-01-01

## 2018-01-01 RX ORDER — FUROSEMIDE 10 MG/ML
20 INJECTION INTRAMUSCULAR; INTRAVENOUS ONCE
Status: COMPLETED | OUTPATIENT
Start: 2018-01-01 | End: 2018-01-01

## 2018-01-01 RX ORDER — LOSARTAN POTASSIUM 100 MG/1
TABLET ORAL
Qty: 90 TABLET | Refills: 0 | Status: SHIPPED | OUTPATIENT
Start: 2018-01-01 | End: 2018-01-01

## 2018-01-01 RX ORDER — PANTOPRAZOLE SODIUM 40 MG/1
40 TABLET, DELAYED RELEASE ORAL NIGHTLY
Qty: 14 TABLET | Refills: 0 | Status: SHIPPED | OUTPATIENT
Start: 2018-01-01 | End: 2018-01-01

## 2018-01-01 RX ORDER — ACETAMINOPHEN 325 MG/1
650 TABLET ORAL EVERY 6 HOURS PRN
Qty: 30 TABLET | Refills: 0 | Status: SHIPPED | OUTPATIENT
Start: 2018-01-01

## 2018-01-01 RX ORDER — FUROSEMIDE 10 MG/ML
20 INJECTION INTRAMUSCULAR; INTRAVENOUS ONCE
Status: DISCONTINUED | OUTPATIENT
Start: 2018-01-01 | End: 2018-01-01

## 2018-01-01 RX ORDER — PANTOPRAZOLE SODIUM 40 MG/1
40 TABLET, DELAYED RELEASE ORAL NIGHTLY
Status: DISCONTINUED | OUTPATIENT
Start: 2018-01-01 | End: 2018-01-01

## 2018-01-01 RX ORDER — MEPERIDINE HYDROCHLORIDE 25 MG/ML
12.5 INJECTION INTRAMUSCULAR; INTRAVENOUS; SUBCUTANEOUS AS NEEDED
Status: CANCELLED | OUTPATIENT
Start: 2018-01-01

## 2018-01-12 ENCOUNTER — TELEPHONE (OUTPATIENT)
Dept: FAMILY MEDICINE CLINIC | Facility: CLINIC | Age: 83
End: 2018-01-12

## 2018-01-12 NOTE — TELEPHONE ENCOUNTER
[facility] from [facility] is calling, she took Kareen's blood pressure yesterday and it was 178/88, pt stated that she had forgotten to take her medication.   [facility] had her take it while she was there and called the patient later that evening f

## 2018-02-19 NOTE — TELEPHONE ENCOUNTER
Alton Staff [facility] is calling to get orders from Dr Sherry Gan for additional nursing visits for this week for Christiano Howell, she is also coming up to end of care, and she would like Dr Sherry Gan to approve continuation of care for Christiano Dante, Please call M

## 2018-02-19 NOTE — TELEPHONE ENCOUNTER
Called Britta Light and explained that pt is stable and no further orders per provider  Asked pt after reviewing message to call office with any questions.

## 2018-02-28 PROBLEM — N81.2 UTEROVAGINAL PROLAPSE, INCOMPLETE: Status: ACTIVE | Noted: 2018-01-01

## 2018-04-19 NOTE — PROGRESS NOTES
Case cancelled by Dr. Darek Cristobal prior to administration of any anesthetic medications. Dr. Alfonso MOTA. Lea Gaitan M.D.   [facility], [employer].

## 2018-04-19 NOTE — ANESTHESIA PREPROCEDURE EVALUATION
PRE-OP EVALUATION    Patient Name: Zach Mar    Pre-op Diagnosis: LYMPHADENOPATHY, LUNG NODULE    Procedure(s):  ENDOBRONCHIAL ULTRASOUND AND CYTOLOGY BRONCHOSCOPY WITH C-ARM, RADIAL PROBE ULTRASOUND, POSSIBLE NAVIGATION      Surgeon(s) and Role: ear removed Lymphoma  2010: OTHER SURGICAL HISTORY      Comment: lymphoma in the groin  01/16/2018: OTHER SURGICAL HISTORY      Comment: Macy Solorio     Smoking status: Current Every Day Smoker  0.50 Packs/day     Types: Cigarettes    Smokeless tobacco:

## 2018-04-19 NOTE — PROGRESS NOTES
IP Brief Note    preprocedural evaluation of patient, the patient and daughter note she has had worsening dyspnea, productive cough and fatigue. Examination with decreased breath sounds, wheezing and rhonchi bilaterally consistent with COPD exacerbation.

## 2018-05-08 NOTE — ANESTHESIA POSTPROCEDURE EVALUATION
[address] [facility] Patient Status:  Hospital Outpatient Surgery   Age/Gender 80year old female MRN [id number]   Location [address] [facility]. Attending Julia Fitch MD   Hosp Day # 0 PCP Ayden Quintanilla DO       Anesthesia Post-

## 2018-05-08 NOTE — ANESTHESIA PREPROCEDURE EVALUATION
PRE-OP EVALUATION    Patient Name: Raghavendra Sidhu    Pre-op Diagnosis: LYMPHADENOPATHY,LUNG NODULE      Procedure(s):  ENDO-BRONCHIAL ULTRASOUND  with TRANSBRONCHIAL NEEDLE ASPIRATION, CYTOLOGY BRONCHOSCOPY     Surgeon(s) and Role:     Maxine Howard (+) dysrhythmias and atrial fibrillation                  Endo/Other    Negative endo/other ROS. Pulmonary        (+) COPD and moderate  COPD requiring home oxygen. Neuro/Psych    Negative neuro/psych ROS.

## 2018-05-08 NOTE — DISCHARGE SUMMARY
Outpatient Surgery Brief Discharge Summary         Patient ID:  Adelita Rios  [id number]  80year old  12/20/1929    Discharge Diagnoses: lung nodule, lymphadenopathy    Procedures: bronchoscopy with endobronchial ultrasound transbronchial fine needle

## 2018-05-08 NOTE — OPERATIVE REPORT
[address] [facility] Patient Status:  Hospital Outpatient Surgery    1929 MRN [id number]   [facility] ENDOSCOPY Attending Shiv Vidal MD   Hosp Day # 0 PCP Carlo Gee DO     OPERATIVE REPORT:     DATE OF Max He insufficiently evaluated due to level of sedation but appeared normal. The bronchoscope was then advanced into the trachea. Evaluation of the trachea and main stem airways did not reveal evidence of endobronchial lesion to the segmental levels.    Amando Anaya

## 2018-05-14 NOTE — TELEPHONE ENCOUNTER
Losartan approved qty 30 NR  Patient past due for ov for HTN  Please call to schedule appt-will need for further refills  [phone] (home)

## 2018-05-16 PROBLEM — C34.90 NON-SMALL CELL LUNG CANCER (HCC): Status: ACTIVE | Noted: 2018-01-01

## 2018-05-31 NOTE — PROGRESS NOTES
[address] [facility] RADIATION ONCOLOGY CONSULTATION    PATIENT:   Heather Peña      80year old      12/20/1929    REFERRING MD:  Dr. Nikki Roger    DIAGNOSIS:   T2 N1 M0 non-small cell carcinoma, left upper lung    CC:    Left lung cancer      HPI: COPD, hypertension, macular degeneration, lung cancer, history of non-Hodgkin lymphoma status post chemotherapy, status post right femur fracture with intramedullary honey, no history of radiation therapy    MEDS:  Losartan, naproxen as needed, aspirin, Dule Although nonmetastatic, I do not think she would do well with a full course of external beam radiation therapy, especially with concurrent chemotherapy    She also states that she is not in the mindset to pursue aggressive therapy    I think a palliative d

## 2018-05-31 NOTE — PROGRESS NOTES
Nursing Consultation Note  Patient: Ramy Mtz  YOB: 1929  Age: 80year old  Radiation Oncologist: Dr. Jayne Lynch  Referring Physician:  [email or web address]  Consult Date: 5/31/2018      Chemotherapy: N/A  Labs: Mae Masters paratracheal lymphadenopathy -     SOB on exertion, uses supplemental oxygen at home (2L), appetite ok, has a productive cough, occasional chest pain, legally blind, daily BM's, urinating w/o problems, uses a rolling walker at home PRN, lives at home with [address] [facility], [phone], [facility] [address] [address]  Phone: [phone] Fax: [phone]      Past Medical History:   Diagnosis Date   • COPD (chronic obstructive pulmonary disease) ([facility] (e.g. office work, light house work). Education:  YES    Are ADL's met? Yes  Does patient feel safe in their environment? Yes  Care decisions:  Patient and/or surrogate IS involved in care decisions.   Advanced directives:  Patient DOES NOT have advanc

## 2018-06-11 NOTE — PROGRESS NOTES
[facility] Radiation Treatment Management Note 1-5    Patient:  Zach Luo  Age:  80year old  Visit Diagnosis:    1.  Primary malignant neoplasm of left upper lobe of lung [facility])      Primary Rad/Onc:  Dr. Beverley Borden

## 2018-06-18 NOTE — PROGRESS NOTES
[facility] Radiation Treatment Management Note 6-10    Patient:  Diaz Wahl  Age:  80year old  Visit Diagnosis:    1.  Primary malignant neoplasm of left upper lobe of lung ([address]      Primary Rad/Onc:  Dr. Husam Ham    Site

## 2018-06-19 NOTE — PATIENT INSTRUCTIONS
POST-RADIATION INSTRUCTIONS:   - CALL [phone] FOR A FOLLOW-UP WITH DR. Brina Cisneros in one week  - FOLLOW-UP WITH Dr Lura Sandifer after radiation complete  - SIDE EFFECTS OF RADIATION WILL GRADUALLY SUBSIDE, IT MAY TAKE UP TO 2 WEEKS POST-RADIATION FOR YOU TO NOTI

## 2018-06-23 PROBLEM — E16.2 HYPOGLYCEMIA: Status: ACTIVE | Noted: 2018-01-01

## 2018-06-23 PROBLEM — R07.9 CHEST PAIN OF UNCERTAIN ETIOLOGY: Status: ACTIVE | Noted: 2018-01-01

## 2018-06-23 PROBLEM — C34.92 NON-SMALL CELL CANCER OF LEFT LUNG (HCC): Status: ACTIVE | Noted: 2018-01-01

## 2018-06-23 PROBLEM — J44.9 CHRONIC OBSTRUCTIVE PULMONARY DISEASE, UNSPECIFIED COPD TYPE (HCC): Status: ACTIVE | Noted: 2018-01-01

## 2018-06-23 NOTE — ED PROVIDER NOTES
Patient Seen in: [facility] Emergency Department    History   Patient presents with:  Chest Pain Angina (cardiovascular)  Dyspnea GAURI SOB (respiratory)    Stated Complaint: CP    HPI    Patient presents with acute chest pain which started in her sub tobacco: Never Used                      Alcohol use: Yes           8.4 oz/week     Glasses of wine: 14 per week     Comment: 2-2.5 glasses of wine daily      Review of Systems    Positive for stated complaint: CP  Other systems are as noted in HPI.   Const Chloride 98 (*)     All other components within normal limits   D-DIMER - Abnormal; Notable for the following:     D-Dimer 0.53 (*)     All other components within normal limits    Narrative:     FEU = Fibrinogen Equivalent Units.     D-Dimer results of Report.   Rate: 85  Rhythm: Sinus Rhythm  Reading: No acute change in EKG when compared to EKG performed September 2017           ED Course as of Jun 23 2301  ------------------------------------------------------------  Time: 06/23 6358  Value: D-DIMER: Stef Nicholson

## 2018-06-23 NOTE — CONSULTS
[facility]  Report of Consultation    Mingo Blunt Patient Status:  Emergency    1929 MRN [id number]   Location 6[address] [facility] Attending Norma Armijo MD   Hosp Day # 0 PCP Vince Vick DO     Reason for Consult lymphoma  · Macular degeneration    Past Surgical History:  2009: OTHER SURGICAL HISTORY      Comment: right tumor behind ear removed Lymphoma  2010: OTHER SURGICAL HISTORY      Comment: lymphoma in the groin  01/16/2018: OTHER SURGICAL HISTORY      Commen dizziness, seizures, memory problems, trouble swallowing, speech problems, gait problems and weakness. Behavioral/Psych: Positive for active tobacco use. Endocrine: No history of diabetes, thyroid disorder. All other review of systems are negative.     V central pulmonary embolus. 2. Diffuse emphysematous changes are seen. There is a mass involving the left upper lobe, central cavitary changes are identified which are more prominent when compared to the previous study.   Overall size of the mass now measu

## 2018-06-23 NOTE — H&P
[facility]                                                               History & Physical         Ayla Navarro Patient Status:  Emergency    1929 MRN [id number]   Location [address] [facility] Attending Ilsa Ortega OTHER SURGICAL HISTORY      Comment: right tumor behind ear removed Lymphoma  2010: OTHER SURGICAL HISTORY      Comment: lymphoma in the groin  01/16/2018: OTHER SURGICAL HISTORY      Comment: Cassieo- Dr. Willoughby Factor    Family history:  Family History   Problem Rel 06/23/2018   HCT 42.3 06/23/2018   .0 06/23/2018   CREATSERUM 0.70 06/23/2018   BUN 12 06/23/2018    06/23/2018   K 4.0 06/23/2018   CL 98 06/23/2018   CO2 31.0 06/23/2018   GLU 52 06/23/2018   CA 9.3 06/23/2018   ALB 3.6 06/23/2018   ALKPHO 6 No mass or adenopathy. CARDIAC:  No enlargement, pericardial thickening, or significant calcification. PLEURA:  No mass or effusion. CHEST WALL:  No mass or axillary adenopathy. LIMITED ABDOMEN:  There is a 2.9 cm cyst within the right kidney.   B protocol      Quality:  · DVT Prophylaxis: SCDs, subcutaneous Lovenox  · CODE status: full  · Herrera: no    Plan of care discussed with patient and patient's son at bedside      Discussed with ER Physician.             Corwin Del Valle MD  6/23/2018  5:51 PM

## 2018-06-23 NOTE — ED INITIAL ASSESSMENT (HPI)
PT C/O PAIN IN HER CHEST WHEN SHE TRIED TO SWALLOW SOME Larena Marchi. PT STATES SHE STARTED RADIATION 12 DAYS AGO FOR L LUNG CA. PT STATES THE GAURI IS NORMAL FOR HER.

## 2018-06-24 NOTE — CONSULTS
[facility]    Gastroenterology Initial Consultation    Tamika Marmolejo Patient Status:  Observation    1929 MRN [id number]   [facility] 8NE-A Attending Fernanda Higuera MD   Hosp Day # 0 PCP Mendoza Zamudio DO       Reason for C HISTORY      Comment: lymphoma in the groin  01/16/2018: OTHER SURGICAL HISTORY      Comment: Cassieo- Dr. Karthikeyan Polo  Family History   Problem Relation Age of Onset   • Other[other] [OTHER] Father 79     stroke   • Other[other] [OTHER] Sister      anorexia   • Yolanda Sodium (LOVENOX) 30 MG/0.3ML injection 30 mg, 30 mg, Subcutaneous, Nightly  •  acetaminophen (TYLENOL) tab 650 mg, 650 mg, Oral, Q6H PRN  •  Pantoprazole Sodium (PROTONIX) 40 mg in Sodium Chloride 0.9 % 10 mL IV push, 40 mg, Intravenous, Q24H    Review of no acute distress  Heent: Normal oropharynx, no scleral icterus, normal conjunctiva  CV: Normal heart sounds, rhythm, peripheral pulses and rate  Respiratory: Clear to auscultation bilaterally  Abdomen: Soft, non-tender, non-guarded, non-distended, normal

## 2018-06-24 NOTE — PROGRESS NOTES
[facility] Pharmacy Note:  Renal Dose Adjustment for Enoxaparin (LOVENOX)    Sharyle Sever has been prescribed Enoxaparin (LOVENOX) 40 mg subcutaneously every 24 hours.     Her calculated creatinine clearance is > 30 ml/min, therefore, the dose of Enoxaparin (

## 2018-06-24 NOTE — PROGRESS NOTES
[facility]  Progress Note    Anand Melendez Patient Status:  Observation    1929 MRN [id number]   [facility] 8NE-A Attending Kinga Du MD   Hosp Day # 0 PCP Elena Vincent DO     CC: Chest pain    SUBJECTIVE:  No more c 101 06/24/2018   CO2 28.0 06/24/2018   GLU 89 06/24/2018   CA 8.6 06/24/2018   ALB 2.9 06/24/2018   ALKPHO 55 06/24/2018   BILT 0.4 06/24/2018   TP 5.7 06/24/2018   AST 17 06/24/2018   ALT 16 06/24/2018   DDIMER 0.53 06/23/2018   MG 1.9 06/24/2018   TROP < 4-0.006 g chewable tab 4 tablet 4 tablet Oral Q15 Min PRN   Or      dextrose 50 % injection 50 mL 50 mL Intravenous Q15 Min PRN   Or      glucose (DEX4) oral liquid 30 g 30 g Oral Q15 Min PRN   Or      Glucose-Vitamin C (DEX-4) 4-0.006 g chewable tab 8 tab bedside.             Melva Razo MD  6/24/2018  3:01 PM

## 2018-06-24 NOTE — PROGRESS NOTES
[facility]  Progress Note    Magali Laughter Patient Status:  Observation    1929 MRN [id number]   [facility] 8NE-A Attending Renate Jo MD   [facility] Day # 0 PCP Montserrat Franklin DO     Subjective:  Magali Laughter is a(n) 8 MCH  28.5  28.8   MCHC  32.6  33.1   RDW  14.2  14.2   NEPRELIM  3.63  1.88   WBC  6.1  3.4*   PLT  273.0  208.0     Recent Labs   Lab  06/23/18   1610  06/24/18   0520   GLU  52*  89   BUN  12  8   CREATSERUM  0.70  0.54*   GFRAA  89  97   GFRNAA  78  8

## 2018-06-24 NOTE — ED NOTES
Attempted to call report, floor RN to call back shortly. Pt's food tray is still pending from kitchen. Pt is stable and free of chest pain.

## 2018-06-24 NOTE — SLP NOTE
ADULT SWALLOWING EVALUATION    ASSESSMENT    ASSESSMENT/OVERALL IMPRESSION:  Pt awake and in bed requesting to go to sleep. Pt agreeable to swallow evaluation.   Pt reporting that she is feeling things get stuck in her throat and that she will have pain in pulmonary disease) ([facility]     O2 @ 2L/nc at night   • Exudative senile macular degeneration of retina ([facility]     Left   • Hearing impairment     hearing loss-no hearing aids   • Non-small cell lung cancer ([facility] 05/15/2018   • Osteoporosis, unspecified    • O assistance  Patient Positioning: Upright    Oral Phase of Swallow:  Within Functional Limits                      Pharyngeal Phase of Swallow: Impaired  Laryngeal Elevation Timing: Appears intact  Laryngeal Elevation Strength: Appears impaired  Laryngeal El

## 2018-06-24 NOTE — PLAN OF CARE
CARDIOVASCULAR - ADULT    • Absence of cardiac arrhythmias or at baseline Progressing        Diabetes/Glucose Control    • Glucose maintained within prescribed range Progressing        GASTROINTESTINAL - ADULT    • Maintains or returns to baseline bowel fu

## 2018-06-24 NOTE — ED NOTES
Report given to Diego Steele RN. Transport paged. Pt to floor shortly, no complaints voiced, no distress noted.

## 2018-06-25 NOTE — PROGRESS NOTES
[facility]  Progress Note    You Frank Patient Status:  Observation    1929 MRN [id number]   [facility] 8NE-A Attending Jonna Gastelum MD   Hosp Day # 0 PCP Mike Key DO     CC: Chest pain    SUBJECTIVE:  No more c  06/25/2018   K 4.1 06/25/2018    06/25/2018   CO2 28.0 06/25/2018   GLU 90 06/25/2018   CA 8.7 06/25/2018   MG 1.8 06/25/2018   PGLU 103 06/25/2018        XR CHEST AP PORTABLE  (CPT=71045)     TECHNIQUE:  AP chest radiograph was obtained. tab 4 tablet 4 tablet Oral Q15 Min PRN   Or      dextrose 50 % injection 50 mL 50 mL Intravenous Q15 Min PRN   Or      glucose (DEX4) oral liquid 30 g 30 g Oral Q15 Min PRN   Or      Glucose-Vitamin C (DEX-4) 4-0.006 g chewable tab 8 tablet 8 tablet Oral Q Lovenox  · CODE status: full  · Herrera: no    Estimated date of discharge: Today  Discharge is dependent on: Clinical progress  At this point Ms. Cecilio Simmons  is expected to be discharge to: Home      Questions/concerns and Plan of care were discussed with cong

## 2018-06-25 NOTE — PROGRESS NOTES
[facility]  Progress Note    Tamara Jordan Patient Status:  Observation    1929 MRN [id number]   [facility] 8NE-A Attending Crissy Lion MD   [address] Day # 0 PCP Marisa Cardoso DO     Subjective:  Tamara Jordan is a(n) 8 86.6   MCH  28.5  28.8  28.7   MCHC  32.6  33.1  33.1   RDW  14.2  14.2  14.2   NEPRELIM  3.63  1.88   --    WBC  6.1  3.4*  5.1   PLT  273.0  208.0  204.0     Recent Labs   Lab  06/23/18   1610  06/24/18   0520  06/25/18   0457   GLU  52*  89  90   BUN  1

## 2018-06-25 NOTE — CONSULTS
[facility] Pharmacy Note: Route Optimization for Pantoprazole (PROTONIX)    Patient is currently on Pantoprazole (PROTONIX) 40 mg IV every 24 hours.    The patient meets the criteria to convert to the oral equivalent as established by the IV to Oral conversion pro

## 2018-06-25 NOTE — SLP NOTE
ADULT VIDEOFLUOROSCOPIC SWALLOWING STUDY    Admission Date: 6/23/2018  Evaluation Date: 06/25/18  Radiologist: Dr. Zhou Copeland: Regular; Soft diet  Diet Recommendations - Liquid:  Thin (small, single sips utilizi M.D.   • Shortness of breath     with exertion   • Visual impairment     glasses       Current Diet Consistency: Regular;Nectar thick liquids           Precautions: Aspiration             Family/Patient Goals:   To determine etiology of her complaints    AS Limits  Triggered at: Base of tongue  Delay (seconds): none  Residue Severity, Location:  (no significant pharyngeal residue)  Laryngeal Penetration: None  Tracheal Aspiration: None     HARD SOLID  Oral Phase of Swallow (VFSS - Hard Solid):  Within Function of exam, diet recommendations, aspiration precautions, compensatory strategies, ENT/GI consult recommendation and plan at length with pt and pt's dtr present following. Answered questions to their apparent satisfaction with good understanding reported.  Pt

## 2018-06-25 NOTE — DISCHARGE SUMMARY
[facility]  Discharge Summary    Ayla Navarro Patient Status:  Observation    1929 MRN [id number]   [facility] 8NE-A Attending No att. providers found   Hosp Day # 0 PCP Mirta Christianson DO     Date of Admission: 2018 baseline. Denies any focal weakness or numbness. Denies any chest pain. Denies any shortness of breath.     Brief Synopsis: Admitted with substernal chest pain and feeling off food getting stuck in her throat when having a bagel with some ginger ale at h results pending at Discharge:   · none      Discharge Medications:        Discharge Medications      START taking these medications      Instructions Prescription details   Pantoprazole Sodium 40 MG Tbec  Commonly known as:  PROTONIX      Take 1 tablet (40 Phone:  [phone]   · Pantoprazole Sodium 40 MG Tbec     Please  your prescriptions at the location directed by your doctor or nurse    Bring a paper prescription for each of these medications  · predniSONE 20 MG Tabs              Follow up Vi

## 2018-06-25 NOTE — PLAN OF CARE
NURSING DISCHARGE NOTE    Discharged Other, (see nursing note) via Wheelchair. Accompanied by Support staff  Belongings Taken by patient/family. Pt discharged to the cancer center for her radiation treatment via w/c by transport staff.  Pt Son Luis Penaly

## 2018-06-25 NOTE — PROGRESS NOTES
[facility] Radiation Treatment Management Note 11-15    Patient:  Tamika Marmolejo  Age:  80year old  Visit Diagnosis:    1.  Primary malignant neoplasm of left upper lobe of lung ([address]      Primary Rad/Onc:  Dr. Doreatha Meckel    Site

## 2018-06-27 NOTE — PROGRESS NOTES
Initial Post Discharge Follow Up   Discharge Date: 6/25/18  Contact Date: 6/27/2018    Consent Verification:  Assessment Completed With: Patient  HIPAA Verified?   Yes    Discharge Dx:    Atypical substernal chest pain, Oropharyngeal dysphagia with feeli 0   aspirin 81 MG Oral Tab EC Take 81 mg by mouth daily.  Disp:  Rfl:    DULERA 200-5 MCG/ACT Inhalation Aerosol Inhale 2 puffs twice a day Disp:  Rfl: 3   Tiotropium Bromide Monohydrate (SPIRIVA HANDIHALER) 18 MCG Inhalation Cap INHALE CONTENTS OF 1 CAPSUL with Tisha Napoles. Who do you call with worsening COPD symptoms? I will call the pulmonologist   Do you know when to call with COPD symptoms? Yes   Do you have any of the following potential risk factors for COPD in your home environment?   Primary or seconda

## 2018-06-27 NOTE — TELEPHONE ENCOUNTER
Patient discharged home from [facility] on 6/25/18 and is at High risk for readmission and recommended to have a TCM HFU by 7/2/18, no later than 7/9/18 or sooner.  NOEL attempted to schedule a TCM HFU appointment and patient states she will call hersmaribel

## 2018-06-27 NOTE — PROGRESS NOTES
[facility] RADIATION ONCOLOGY   TREATMENT SUMMARY    PATIENT:  Isabelle Bragg MD: Dr. Ton Villagomez    DIAGNOSIS:  Non-small cell carcinoma of left upper lung    CANCER HISTORY:  80-year-old woman with COPD, ongoing cigarette smoker,

## 2018-06-28 NOTE — TELEPHONE ENCOUNTER
Spoke to patient and discussed about having her come in and she said that Tuesdays are better for her and after discussing with Hersoleg Mtz pt was put in with Dr. Ashlee Khan on 7/3/18 for her TCM visit.

## 2018-07-10 NOTE — PATIENT INSTRUCTIONS
-- home health will call you to set up an appt within 2 days, let us know if you don't hear from them  -- they can do both physical therapy and help with swallowing  -- followup with pulmonary as planned  -- followup with Yariel Tobin in 1 month, sooner if n

## 2018-07-13 NOTE — PROGRESS NOTES
HPI:    Sharyle Sever is a 80year old female here today for hospital follow up.    She was discharged from Inpatient hospital, [facility] to Home   Admit Date: 6/23/18  Discharge Date: 6/25/18  Hospital Discharge Diagnosis:        Atypical subster from her recent hospitalization  -she is outside of the 2 wk window to be seen for TCM  -however, she is doing much better  -her swallowing has improved  -she also no longer has any chest discomfort or breathing difficulties  -she complains of just feeling Heart Disorder (age of onset: 79) in her brother; Other in her sister; Other (age of onset: 79) in her father. She  reports that she has been smoking Cigarettes. She has been smoking about 0.50 packs per day.  She has never used smokeless tobacco. She re times three, cranial nerves are intact, motor and sensory are grossly intact    ASSESSMENT/ PLAN:   Diagnoses and all orders for this visit:    Chest pain of uncertain etiology  Other dysphagia  Non-small cell cancer of left lung (HCC)  Chronic obstructive

## 2018-08-14 PROBLEM — F17.219 CIGARETTE NICOTINE DEPENDENCE WITH NICOTINE-INDUCED DISORDER: Chronic | Status: ACTIVE | Noted: 2018-01-01

## 2018-08-14 PROBLEM — R07.9 ACUTE CHEST PAIN: Status: ACTIVE | Noted: 2018-01-01

## 2018-08-14 NOTE — ED PROVIDER NOTES
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 Normoactive bowel sounds. No rebound. No guarding. EXTREMITIES: Warm with brisk capillary refill.        ED Course     Labs Reviewed   COMP METABOLIC PANEL (14) - Abnormal; Notable for the following:        Result Value    Gluc ([address]) which includes the Dose Index Registry. PATIENT STATED HISTORY:(As transcribed by Technologist)  Patient states that her usual shortness of breath has worsen in the past couple of days.    CONTRAST USED:  50cc of Omnipaqu contours are stable. Previously identified area of cavitation within the mid left chest is not as well appreciated on today's exam.  No new focal consolidation.       CONCLUSION:  Some scarring is likely present within the mid left chest where there was a 6/23/2018 Yes    Cigarette nicotine dependence with nicotine-induced disorder (Chronic) F17.219 8/14/2018 Yes    Controlled type 2 diabetes mellitus without complication, without long-term current use of insulin ([facility] E11.9 8/22/2015 Yes    Essential hyper

## 2018-08-15 NOTE — PLAN OF CARE
NURSING ADMISSION NOTE      Patient admitted via Cart  Oriented to room. Safety precautions initiated. Bed in low position. Call light in reach.   Admission navigator completed    Patient/Family Goals    • Patient/Family Long Term Goal Progressing

## 2018-08-15 NOTE — PROGRESS NOTES
[facility] Pharmacy Note:  Renal Dose Adjustment for Metoclopramide (REGLAN)    Samaria Oneal has been prescribed Metoclopramide (REGLAN) 10 mg every 8 hours as needed for N/V. Estimated Creatinine Clearance: 32.5 mL/min (based on SCr of 0.78 mg/dL).     He

## 2018-08-15 NOTE — PROGRESS NOTES
[facility]  Cardiology Progress Note    Subjective:  No chest pain or shortness of breath.     Objective:  /66 (BP Location: Left arm)   Pulse 73   Temp 97.3 °F (36.3 °C) (Oral)   Resp 18   Ht 4' 11\" (1.499 m)   Wt 91 lb (41.3 kg)   LMP 01/01/19 management of chest pain, patient says she feels much better today with no chest pain. I called and left messages with the patient's daughters updating them on the plan of care. · I reviewed the case with Dr Oumar Gray, patient ok to be d/c'd home.  Follow

## 2018-08-15 NOTE — PROGRESS NOTES
08/15/18 1420   Clinical Encounter Type   Visited With Patient and family together  (Daughter was present)   Routine Visit (Responded to Maddie Verma' request/comsult)   Continue Visiting ( will remain available at the pager # [phone] as needed/requested.

## 2018-08-15 NOTE — DISCHARGE SUMMARY
[facility] HOSPITALIST  DISCHARGE SUMMARY     Cassy Garcia Patient Status:  Observation    1929 MRN [id number]   [facility] 2NE-A Attending No att. providers found   Hosp Day # 0 PCP Windy Flores DO     Date of Admission: 20 out, no PE on CTA chest. She will be discharged home and follow up as outpatient.      Consultants  • Cardiology     Discharge Medication List:     Discharge Medications      CONTINUE taking these medications      Instructions Prescription details   Mague Arrington edema.  -----------------------------------------------------------------------------------------------  PATIENT DISCHARGE INSTRUCTIONS: See electronic chart    Omi Alcaraz MD     Time spent:  > 30 minutes

## 2018-08-15 NOTE — PHYSICAL THERAPY NOTE
PHYSICAL THERAPY QUICK EVALUATION - INPATIENT    Room Number: 1613/9932-F  Evaluation Date: 8/15/2018  Presenting Problem: Acute chest pain  Physician Order: PT Eval and Treat    Problem List  Principal Problem:    Acute chest pain  Active Problems:    Con (rollator)  Patient Regularly Uses: Home O2 (at [facility] only)    Prior Level of [place]: The pt reports that she ambulates without the use of an assistive device, but ALWAYS has the support of furniture or a wall around her home.   When leaving the home th 200  Assistive Device: Rolling walker  Pattern:  (step-through gait pattern)  Stoop/Curb Assistance: Not tested  Comment : Above grades based upon FIM definitions per department policy    Skilled Therapy Provided: The pt was approached for therapy lying mitchell Discussed recommendations with pt and dtr, Chetan Hendricks, at bedside. Discussed benefits of RW including engergy conservation and balance support. Discussed additional equipment (glide caps) to optimize pt's walker at home.   Pt agrees with recommendations and agre

## 2018-08-15 NOTE — PROGRESS NOTES
[facility] HOSPITALIST  Progress Note     Yanely Fierro Patient Status:  Observation    1929 MRN [id number]   [facility] 2NE-A Attending Mary Stallings MD   Hosp Day # 0 PCP Zoila Gil DO     Chief Complaint: admitted for CP Umeclidinium Bromide  1 puff Inhalation Daily   • aspirin  81 mg Oral Daily   • enoxaparin  40 mg Subcutaneous Daily       ASSESSMENT / PLAN:     1. Atypical chest pain-  1. ACS ruled out   2. Echo reviewed  3.  Pt would like to go home as her pain has reso

## 2018-08-15 NOTE — H&P
[facility] HOSPITALIST  History and Physical     Manda Schwab Patient Status:  Observation    1929 MRN [id number]   [facility] 2NE-A Attending Zay Franklin MD   Hosp Day # 0 PCP Donald Rashid DO     Chief Complaint: Chest pain with exertion   • Visual impairment     glasses        Past Surgical History: Past Surgical History:  2009: OTHER SURGICAL HISTORY      Comment: right tumor behind ear removed Lymphoma  2010: OTHER SURGICAL HISTORY      Comment: lymphoma in the groin Clear to auscultation bilaterally. No wheezes. No rhonchi. Cardiovascular: S1, S2. Regular rate and rhythm. No murmurs, rubs or gallops. Equal pulses. Chest and Back: No tenderness or deformity. Abdomen: Soft, nontender, nondistended.   Positive bowel s

## 2018-08-15 NOTE — PLAN OF CARE
Pt arrived to floor via stretcher from ED. ELAINE Montanez is getting the pt settled in and doing VS. Report received from ED nurse at 1806, and pt arrive to unit at [address] order to r/o PE. Will give report to Chris Vazquez RN for night shift.  Call light in r

## 2018-08-15 NOTE — PLAN OF CARE
Patient/Family Long Term Goal Progressing      Patient/Family Short Term Goal Progressing         Received report from night RN. Pt is sitting up in bed. No s/s of acute distress noted at this time. VSS. Pt denies any pain at this time.  Pt is alert and cyrus

## 2018-08-15 NOTE — CONSULTS
[facility] Cardiology  Report of Consultation    Chad Cantor Patient Status:  Observation    1929 MRN [id number]   [facility] 2NE-A Attending Edgar Guevara MD   Hosp Day # 0 PCP Patrizia Mg DO     Reason for Consultation:  CP • Other[other] [OTHER] Father 79     stroke   • Other[other] [OTHER] Sister      anorexia   • Diabetes Sister    • Heart Disorder Brother 79     AMI   • Cancer Sister      small cell cancer lung      reports that she has been smoking Cigarettes.   She has °C), Min:97.5 °F (36.4 °C), Max:98.1 °F (36.7 °C)    Wt Readings from Last 3 Encounters:  08/14/18 : 91 lb (41.3 kg)  07/10/18 : 91 lb (41.3 kg)  06/24/18 : 91 lb 7.9 oz (41.5 kg)      Telemetry: SR  General: Alert and oriented in no apparent distress.   HE

## 2018-08-16 NOTE — CM/SW NOTE
08/16/18 0935   Discharge disposition   Expected discharge disposition Home or Self   Name of [id number] [place]   PT recommended home. / to remain available for support and/or discharge planning.      [facility]

## 2018-08-16 NOTE — PROGRESS NOTES
Initial Post Discharge Follow Up   Discharge Date: 8/15/18  Contact Date: 8/16/2018    Consent Verification:  Assessment Completed With: Patient  HIPAA Verified?   Yes    Discharge Dx:  Acute chest pain     General:   • How have you been since your disch daily. Disp:  Rfl:    Montelukast Sodium (SINGULAIR) 10 MG Oral Tab Take 10 mg by mouth daily. Disp:  Rfl:      • When you were leaving the hospital were any medication changes discussed with you? no, no med changes per pt.   • If you were prescribed a ne in the hospital?  good      Interventions by NCM:  All d/c instructions reviewed with pt. Reviewed when to call MD vs when to go to ER/call 911. Educated pt on the importance of close f/u with PCP and cardiology, as well as taking all meds as prescribed.

## 2018-10-01 PROBLEM — G93.89 BRAIN MASS: Status: ACTIVE | Noted: 2018-01-01

## 2018-10-01 NOTE — ED INITIAL ASSESSMENT (HPI)
Pt aox4. Pt presents to ed from home per [facility]. Pt c/o head pain \"feels swimmy. \" Pt c/o abd pain \"my stomach is doing cart wheels. \" started this am. Pt denies chest pain.

## 2018-10-01 NOTE — CONSULTS
[facility]  Neuro Critical Care Consult Note    Enrique Echevaria Patient Status:  Emergency    1929 MRN [id number]   Location [address] [facility] Attending Meghna Cee MD   Hosp Day # 0 PCP Serena Bucio DO     Date of malaika  01/16/2018: OTHER SURGICAL HISTORY      Comment:  Cysto- Dr. Macario Ayala*    PAIN  Scopolamine                 Comment:HBr SOLN             Altered mental state     Social History:   reports that she has been smoking cigarettes. Well nourished. Well developed. Head: Normocephalic, without obvious abnormality, atraumatic. Eyes: Conjunctivae/corneas clear. No scleral icterus. No conjunctival hemorrhage. Neck: Soft, supple neck; trachea midline.   Lungs: Clear to auscultation chico 100.5°F.    Skin:  -Monitor for skin breakdown. Endocrine:  -Hyperglycemia protocol. A total of 35 minutes of critical care time (exclusive of billable procedures) was administered to manage and/or prevent neurologic instability.  This involved dire

## 2018-10-01 NOTE — ED NOTES
Pt refused straight cath. Pt requested to use bedpan. Pt was cleaned using baby wipes. Pt had wet brief. Pt urine cloudy with foul smell. Pt unable to lay flat d/t GAURI.

## 2018-10-01 NOTE — ED NOTES
Called pts son Cristina Lemus for more information. Cristina Lemus states Pt stated \"she walked in the family room and stated she felt swimmy. \"

## 2018-10-01 NOTE — CONSULTS
[facility]  Neurosurgery Consult    Diaz Wahl Patient Status:  Inpatient    1929 MRN [id number]   [facility] 6NE-A Attending Dutch Butler DO   Hosp Day # 0 PCP Dominik Rose DO     REASON FOR CONSULTATION:  Left occi SURGICAL HISTORY:  Past Surgical History:  5/8/2018: ENDOBRONCHIAL ULTRASOUND (EBUS); N/A      Comment:  Performed by Aminata Valdez MD at [facility] ENDOSCOPY  2/5/2017: FEMUR IM NAIL;  Right      Comment:  Performed by Francis Ovalles MD at [facility] MAIN OR  2009: OTHER time       Current Facility-Administered Medications:  losartan (COZAAR) tab 100 mg 100 mg Oral Daily   Fluticasone Furoate-Vilanterol (BREO ELLIPTA) 200-25 MCG/INH inhaler 1 puff 1 puff Inhalation Daily   Montelukast Sodium (SINGULAIR) tab 10 mg 10 mg Ora Iliospoas  Hamstrings  Quads  D-flexion  P-flexion EHL     Right 5 5 5 5 5 5     Left 5 5 5 5 5 5         DIAGNOSTIC DATA: Lab Results   Component Value Date    WBC 4.6 10/01/2018    HGB 12.8 10/01/2018    HCT 37.8 10/01/2018    .0 10/01/2018    CRE examined with np  Case discussed  79 yo female with lung ca history who presents with headache  Mri shows likely met  Case discussed at tumor board  Plan for radiation  Call with ?'s/concerns

## 2018-10-01 NOTE — ED PROVIDER NOTES
Patient Seen in: [facility] Emergency Department    History   Patient presents with:  Headache (neurologic)    Stated Complaint: headache not feeling well    HPI    Patient is a 80-year-old woman coming in for evaluation today for headache.   Also fe malaika  01/16/2018: OTHER SURGICAL HISTORY      Comment:  Cysto- Dr. Daniel Salinas History    Tobacco Use      Smoking status: Current Every Day Smoker        Packs/day: 0.50        Types: Cigarettes      Smokeless tobacco: Never Used    Alcohol use: Reginald Olmstead Sodium 130 (*)     Chloride 95 (*)     Calculated Osmolality 269 (*)     All other components within normal limits   URINALYSIS WITH CULTURE REFLEX - Abnormal; Notable for the following components:    Urine Color Khadijah (*)     Clarity Urine Cloudy (*) COMPARISON:  [facility] , CT ANGIOGRAPHY, CHEST (CPT=71275), 8/14/2018, 18:03. [facility] , CT BRAIN OR HEAD (17848), 9/04/2017, 20:13. INDICATIONS:  headache not feeling well         TECHNIQUE:  Noncontrast CT scanning is performed through the brain. well         PATIENT STATED HISTORY: (As transcribed by Technologist)  Patient offered     no additional history at this time.                =====    CONCLUSION:  Normal heart size and pulmonary vascularity. Calcified tortuous thoracic aorta. is     most concerning for metastatic lesion given the findings on recent CT     chest.  Other differential considerations such as primary brain tumor and     abscess are considered.   Additional     post-contrast CT or MRI imaging is recommended for furthe clinical staff. Disposition and Plan     Clinical Impression:  Brain mass  (primary encounter diagnosis)    Disposition:  Admit  10/1/2018 12:19 pm    Follow-up:  No follow-up provider specified.       Medications Prescribed:  Current Discharge Medicat

## 2018-10-01 NOTE — PLAN OF CARE
Pt doing well, admitted to CNICU after Occipital lobe brain tumor found on CT scan. Pt currently denies H/A but states she has been having H/A off and on at home, and also a feeling of cloudiness.  Neuro signs intact except for visual defecit and Left facia

## 2018-10-01 NOTE — PROGRESS NOTES
10/01/18 1756   Clinical Encounter Type   Visited With Patient not available   Continue Visiting Yes

## 2018-10-01 NOTE — ED NOTES
Patient updated with plan of care. No Change in patient status. Pt resting comfortably. Rn informed pt of NPO status.

## 2018-10-01 NOTE — ED NOTES
Called & spoke to daughter Kiran Knox. Kiran Knox states yesterday she was doing fine. Daughter gave hx: recent lung cancer, htn, and afib. Pt is legally blind.

## 2018-10-01 NOTE — H&P
[facility] HOSPITALIST  History and Physical     Kulwant Michele Patient Status:  Emergency    1929 MRN [id number]   Location [address] [facility] Attending Albaro Abreu MD   Hosp Day # 0 PCP Mj Morgan DO     Chief Complai HISTORY      Comment:  Cassieo- Dr. Ora Clement History:  reports that she has been smoking cigarettes. She has been smoking about 0.50 packs per day. she has never used smokeless tobacco. She reports that she drinks about 8.4 oz of alcohol per week.  She Normocephalic atraumatic. Moist mucous membranes. EOM-I. PERRLA. Neck: No JVD. No carotid bruits. Respiratory: Clear to auscultation bilaterally. No wheezes. No rhonchi. Cardiovascular: S1, S2. Regular rate and rhythm. No murmurs, rubs or gallops.    Ch

## 2018-10-02 NOTE — PROGRESS NOTES
[facility] Pharmacy Note:  Renal Dose Adjustment for Metoclopramide (REGLAN)    Jonathan Lucio has been prescribed Metoclopramide (REGLAN) 10 mg every 8 hours as needed for nausea/vomiting.     Estimated Creatinine Clearance: 36.8 mL/min (based on SCr of 0.71 m

## 2018-10-02 NOTE — PROGRESS NOTES
[facility]PITAL[facility]  Progress Note     Kulwant Bautista Patient Status:  Inpatient    1929 MRN [id number]   [facility] 6NE-A Attending Leesa Mcnair, [address] [facility] Day # 1 PCP Mj Morgan DO     Chief Complaint: Headache    S: Rg  Daily   • Umeclidinium Bromide  1 puff Inhalation Daily   • dexamethasone Sodium Phosphate  4 mg Intravenous Q6H   • nicotine  1 patch Transdermal Daily   • ipratropium-albuterol  3 mL Nebulization QID       ASSESSMENT / PLAN:     1.  Brain mass with cerebr

## 2018-10-02 NOTE — PROGRESS NOTES
[facility]  Neuro Critical Care Progress Note    Raghavendra Sidhu Patient Status:  Inpatient    1929 MRN [id number]   [facility] 6NE-A Attending Rossana Lucia DO   Hosp Day # 1 PCP Yeny Hillman DO     Subjective:  Continues less than 160 mm Hg. Pulmonary:  -Bronchodilators  -Tolerating room air. Protecting airway. Continue to monitor    Renal:  -Monitor ins and outs. GI:  -NPO until swallow eval completed.     Heme/ID:  -Monitor H&H goal hemoglobin more than 7.  -Afebr

## 2018-10-02 NOTE — CM/SW NOTE
10/02/18 1400   CM/SW Referral Data   Referral Source Social Work (self-referral)   Reason for Referral Discharge planning   Informant Patient; Children   Patient Info   Patient's Mental Status Alert;Oriented;Memory Impairments   Patient's Home Environme

## 2018-10-02 NOTE — PROGRESS NOTES
10/02/18 1844   Clinical Encounter Type   Visited With Patient and family together   Routine Visit Introduction   Continue Visiting Yes  (Patient would like prayer.)   Patient's Supportive Strategies/Resources ( honored patient by acknowledging

## 2018-10-02 NOTE — CONSULTS
[facility] [place] [facility]Y INPATIENT FOLLOW UP    PATIENT:   Diaz Wahl      12/20/1929    DIAGNOSIS:   Solitary left occipital brain metastasis      CANCER HISTORY:  15-year-old woman with COPD, macular degeneration, remote history of non interest of local control and prolongation of current quality of life and function  This could be done once the patient is discharged and felt to be clinically stable    Prior to current illness, despite her poor vision, poor pulmonary reserve, and mild de

## 2018-10-02 NOTE — PLAN OF CARE
Pt received resting in bed, visiting with daughter at bedside. Pt is awake and alert, oriented x4. Pt denies pain or discomfort. Neuro assessment remains intact. Pt to have an MRI tonight. Blood pressure within ordered parameters.  O2 sat % on room ai

## 2018-10-02 NOTE — PROGRESS NOTES
[facility]  Neurosurgery Progress Note    Cassy Garcia Patient Status:  Inpatient    1929 MRN [id number]   [facility] 6NE-A Attending Carolina Rosario DO   Hosp Day # 1 PCP Windy Flores DO     Subjective:  Pt examined, no There is no acute intracranial hemorrhage or extra-axial fluid collection identified. There is no restricted diffusion to suggest acute ischemia/infarction. The visualized paranasal sinuses and mastoid air cells are unremarkable.   The expected major

## 2018-10-03 PROBLEM — Z71.89 GOALS OF CARE, COUNSELING/DISCUSSION: Status: ACTIVE | Noted: 2018-01-01

## 2018-10-03 PROBLEM — Z51.5 PALLIATIVE CARE ENCOUNTER: Status: ACTIVE | Noted: 2018-01-01

## 2018-10-03 PROBLEM — Z51.5 PALLIATIVE CARE BY SPECIALIST: Status: ACTIVE | Noted: 2018-01-01

## 2018-10-03 NOTE — PLAN OF CARE
Uneventful night. Episodes of confusion when wakes up but able to easily reorient. VSS. SR. Afebrile. Denies dizziness but unsteady gait noted. Walker use.       NEUROLOGICAL - ADULT    • Achieves stable or improved neurological status Progressing

## 2018-10-03 NOTE — PROGRESS NOTES
[address] [facility] Progress Note    Samaria Oneal Patient Status:  Inpatient    1929 MRN [id number]   [facility] 7NE-A Attending Maria De Jesus Bermudez MD   Hosp Day # 2 PCP Marcello Ramirez DO           Neurology Attending n fogginess and headache for the past few weeks. CT in ED showed left occipital mass with edema. Patient transferred from [address] [place] [address]. yesterday.   Per nursing, patient had episodes of confusion when she would first wake up, but would reorient without difficul PT/OT to eval and treat. Dr. Lynne Velez to follow.     MORIS Kenney  [employer] [id number]  Pager [phone]  10/3/2018, 10:31 AM

## 2018-10-03 NOTE — CONSULTS
[address]  [id number]  Hospital Day # 2  Date of Consult:  10/3/2018    Reason for Consultation:  Consult requested by Dr. Michael Frye for evaluation of palliative care needs and goals of care    History of Present Lymphoma  08/2013: OTHER SURGICAL HISTORY      Comment:  lymphoma in the groin treated with Radiation  01/16/2018: OTHER SURGICAL HISTORY      Comment:  Cysto- Dr. Mariam Merchant  Social History:     Allergies:    Daliresp [Roflumila*    PAIN  Scopolamine home    A total of 30 minutes were spent; 100% was spent counseling and coordinating care. Thank you for allowing the palliative care service to participate in the care of your patient. Will continue to follow.     Rohini Patel [facility]  Palliative Care N

## 2018-10-03 NOTE — CONSULTS
[address]  [id number]  Hospital Day #2  Date of Consult: 10/03/18       Reason for Consultation: Consult requested for evaluation of palliative care needs and goals of care discussion. honey)  2009: OTHER SURGICAL HISTORY      Comment:  right tumor behind ear removed Lymphoma  08/2013: OTHER SURGICAL HISTORY      Comment:  lymphoma in the groin treated with Radiation  01/16/2018: OTHER SURGICAL HISTORY      Comment:  Cassieo- Dr. Mis Son Team to participate in the care of your patient. We will continue to follow.     JOSE Yoon  Palliative Care Nurse Practitioner  Pager [phone], Phone [phone]  10/3/2018  10:45 AM

## 2018-10-03 NOTE — CM/SW NOTE
RADHA sent updated PT notes via [facility] to the [facility] at [facility].     Lieutenant Martha LCSW

## 2018-10-03 NOTE — PROGRESS NOTES
[facility]PITAL[facility]  Progress Note     Milan Martinez Patient Status:  Inpatient    1929 MRN [id number]   [facility] 7NE-A Attending Prateek Mckee MD   Hosp Day # 2 PCP Flaquita Rea DO     Chief Complaint: Headache   S:  SOB Sodium Phosphate  4 mg Intravenous Q6H   • nicotine  1 patch Transdermal Daily   • ipratropium-albuterol  3 mL Nebulization QID     ASSESSMENT / PLAN:   1.  Brain Mass with Cerebral Edema- Concerning for Metastatic Disease in setting of h/o NSCLC and lympho

## 2018-10-03 NOTE — OCCUPATIONAL THERAPY NOTE
OCCUPATIONAL THERAPY EVALUATION - INPATIENT     Room Number: 2912/6211-V  Evaluation Date: 10/3/2018  Type of Evaluation: Initial  Presenting Problem: headache, brain mass     Physician Order: IP Consult to Occupational Therapy  Reason for Therapy: ADL/IAD • Personal history of antineoplastic chemotherapy [id number]   • Pulmonary nodule, upper lobe-left 07/12/2017    16mm PRANEETH spiculated and new from 2015;  Alvarado Fiore M.D.   • Shortness of breath     with exertion   • Visual impairment     glasses       P errors made and decreased awareness of errors   Awareness of Deficits:  decreased awareness of deficits  Problem Solving:  assistance required to identify errors made, assistance required to generate solutions and assistance required to implement solutions Decreased ability to divert attention between tasks. Receptive to feedback. Pt followed 2-step instructions with increased time, able. Educated the pt about plan of care and d/c plan. Verbalized understanding. Chair alarm on.   Patient End of Session: Up detailed assessments, several treatment options    Overall Complexity MODERATE     OT Discharge Recommendations: Sub-acute rehabilitation(12 to 14 days)       PLAN  OT Treatment Plan: Balance activities; Energy conservation/work simplification techniques;AD

## 2018-10-03 NOTE — PLAN OF CARE
A/O x4 can be forgeful, Legally blind  on RA w/ expiratory wheezes, can desat while asleep. Has home O2. NSR per tele, denies pain. Up w/ 2 assist and a walker. Has poor safety awareness, needs a bed/chair alarm. Plan: consult with palliative care.

## 2018-10-03 NOTE — PHYSICAL THERAPY NOTE
PHYSICAL THERAPY EVALUATION - INPATIENT     Room Number: 4751/9981-Q  Evaluation Date: 10/3/2018  Type of Evaluation: Initial  Physician Order: PT Eval and Treat    Presenting Problem: HA, brain mass  Reason for Therapy: Mobility Dysfunction and Disc malignant lymphomas, unspecified site, extranodal and solid organ sites 0601,2890   • Personal history of antineoplastic chemotherapy [id number]   • Pulmonary nodule, upper lobe-left 07/12/2017    16mm PRANEETH spiculated and new from 2015;  Ton Villagomez M.D. functional limits     BALANCE  Static Sitting: Fair -  Dynamic Sitting: Poor +  Static Standing: Poor +  Dynamic Standing: Poor +    ADDITIONAL TESTS                                    NEUROLOGICAL FINDINGS                      ACTIVITY TOLERANCE  O2 Satur training  Neuromuscular re-educate  Posture  Transfer training    Patient End of Session: Up in chair;Needs met;Call light within reach;RN aware of session/findings; All patient questions and concerns addressed;SCDs in place; Alarm set    ASSESSMENT   Patien assistance level: supervision     Goal #4 Assess stairs as appropriate.     Goal #5    Goal #6    Goal Comments: Goals established on 10/3/2018

## 2018-10-03 NOTE — PROGRESS NOTES
10/03/18 1808   Clinical Encounter Type   Visited With Health care provider  (MARIA ELENA Chamberlain said she would ask medical practitioner to sign when they come in tomorrow)   Fill out a consult or page  at 2000 so POLST can be scanned into patient's EMR o

## 2018-10-04 NOTE — PROGRESS NOTES
[facility]  Progress Note     Elodia Farooq Patient Status:  Inpatient    1929 MRN [id number]   [facility] 7NE-A Attending Katheryn Weaver MD   [address] Day # 3 PCP Eve Gupta DO     Chief Complaint: Headache   S:  SOB mg Oral Daily   • Montelukast Sodium  10 mg Oral Daily   • Umeclidinium Bromide  1 puff Inhalation Daily   • nicotine  1 patch Transdermal Daily   • ipratropium-albuterol  3 mL Nebulization QID     ASSESSMENT / PLAN:   1.  Brain Mass with Cerebral Edema- Co

## 2018-10-04 NOTE — PLAN OF CARE
Assumed care at [address]. AOx3 forgetful at times. Illegally blind bilaterally. Denies any pain or discomfort. Up with assistance. Plan to go to [facility]. Plan of care discussed with pt. Call light within reach.     Impaired Activities of Daily Living

## 2018-10-04 NOTE — PLAN OF CARE
Discharge instructions, prescriptions, medications & follow-up appointments reviewed with pt and daughters. IV NOLAN KOLB'd. To be discharged to [facility] with [facility] Palliative care. Verbal report called to RN at [facility]. Family to drive pt to [facility].

## 2018-10-04 NOTE — PROGRESS NOTES
[id number] [facility] Progress Note    Mingo Blunt Patient Status:  Inpatient    1929 MRN [id number]   [facility] 7NE-A Attending Vladislav Marsh MD   [facility] Day # 3 PCP Vince Vick DO         Subjective:  Mukesh Macedo 131 Daily   • Montelukast Sodium  10 mg Oral Daily   • Umeclidinium Bromide  1 puff Inhalation Daily   • nicotine  1 patch Transdermal Daily   • ipratropium-albuterol  3 mL Nebulization QID       Patient Active Problem List:     Legally blind     Controlled ty Diagnostics/Imaging    MRI brain 10/1: left occipital mass with vasogenic edema   HCT left occipital mass with vasogenic edema     Plan:    Decrease decadron to 2 mg BID po   Plan for [facility] with palliative care   Plan for outpatient radiation oncol

## 2018-10-04 NOTE — CM/SW NOTE
Pt is ready for d/c today. Pt will go to [facility]. Referral made to [facility] for palliative care via ecin. Called Jake Cali at [facility] to coordinate d/c time. Family will come  pt around 3:00pm today.   RN

## 2018-10-04 NOTE — PALLIATIVE CARE NOTE
2355 Guillermo Palomo Follow Up      Layo Sherrymelissa  [id number]       Patient seen and evaluated, no family at bedside. Spoke with two daughters via phone.     ROS: denies complaints    Physical Exam:  GEN:  NAD, tearful this morning

## 2018-10-05 NOTE — H&P
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 HENT: Negative for sore throat and trouble swallowing. Respiratory: Negative for cough and shortness of breath. Cardiovascular: Negative for chest pain, palpitations and leg swelling.    Gastrointestinal: Negative for nausea, vomiting, ab 06/23/2018    CRP <0.29 06/23/2018     (H) 02/06/2011    MG 2.1 10/05/2018    PHOS 2.5 02/05/2017    TROP <0.046 10/01/2018    CK 78 04/10/2015    B12 1,856 (H) 11/01/2013               Assessment/Plan:   1.   Brain mass–stable; on IV Decadron semina as per orders  Consults:PT/OT/SS  Code Status:DNR    Anjali Cottrell MD  10/5/2018

## 2018-10-05 NOTE — DISCHARGE SUMMARY
[facility] HOSPITALIST  DISCHARGE SUMMARY     Cisco Pickett Patient Status:  Inpatient    1929 MRN [id number]   [facility] 7NE-A Attending No att. providers found   Hosp Day # 3 PCP Rachel Packer DO     Date of Admission: 10/1/2018 placed on consultation. Patient was converted to DO NOT RESUSCITATE. Accepted palliative care recommendations. Patient's symptoms improved with no further headache. She is cleared by the consultants and discharged in stable condition.   She will be foll 0     Tiotropium Bromide Monohydrate 18 MCG Caps  Commonly known as:  SPIRIVA HANDIHALER      Inhale 18 mcg into the lungs daily.    Refills:  0           Where to Get Your Medications      Please  your prescriptions at the location directed by your

## 2018-10-05 NOTE — TELEPHONE ENCOUNTER
TC to patients daughter Tasha Snider. Offered appointment for follow up with Dr Oxana Chilel next Tuesday 10/9. Instructed that simulation scan for radiation \"mapping\" will be done at this visit as well. Daughter voiced good understanding, thanked me for the call.  Staff

## 2018-10-05 NOTE — CM/SW NOTE
10/05/18 0900   Discharge disposition   Expected discharge disposition Skilled Nurs   Name of Facillity/Home Care/Hospice [facility]   Additional Home Care/Hospice Provider ([facility])   Outpatient services Palliative   Discharge tra

## 2018-10-09 NOTE — PATIENT INSTRUCTIONS
- YOUR CT SIMULATION SCAN WAS COMPLETED TODAY. THIS IS THE FIRST STEP IN THE RADIATION PLANNING OR \"MAPPING\" PROCESS.     -ONCE THE PHYSICIAN COMPLETES YOUR INDIVIDUALIZED TREATMENT PLAN & YOUR INSURANCE AUTHORIZATION HAS BEEN OBTAINED WE WILL CONTACT YOU

## 2018-10-09 NOTE — TELEPHONE ENCOUNTER
TC to RN staff @ [facility] to instruct on new MD orders. Instructed patient is to continue Decadron 2 mg BID. Also instructed on new med Xanax, to be taken as directed one hour prior to radiation treatments.  Instructed we will notify adolfo

## 2018-10-09 NOTE — PROGRESS NOTES
Nursing Re- Consult Note    Patient: Sharyle Sever  YOB: 1929  Age: 80year old  Shahida Ochoa MD  Referring Physician: No ref.  provider found  Diagnosis:Brain metastasis ([facility]  (primary encounter diagno

## 2018-10-09 NOTE — PROGRESS NOTES
[facility] RADIATION ONCOLOGY FOLLOW UP    PATIENT:   Jonathan Lucio      12/20/1929    DIAGNOSIS:   Brain metastasis      CANCER HISTORY:  15-year-old woman with COPD, macular degeneration, remote history of non-Hodgkin lymphoma cured with chemo Wilber Lagos MD  Radiation Oncology    40 minutes spent with the patient, more than 50% in counseling and in coordination of care.

## 2018-10-17 NOTE — PROGRESS NOTES
You Frank  : 1929  Age 80year old  female patient is admitted to Facility: The [address] [place] [address] for Rehabilitation and Medical Management.     [address] [place] [address] Drive date:  10/1/18  Discharge date to [facility] 12:  10/4/18  ELOS:   da Hearing impairment     hearing loss-no hearing aids   • High blood pressure    • Lymphoma of inguinal region [facility]) 2013    Chemo and Radiation   • Lymphoma of lymph nodes of head, face, and/or neck ([facility] 2009    Chemo   • Non-small cell lung cancer ([facility] 0 Altered mental state    CODE STATUS:  DNR    ADVANCED CARE PLANNING TEAM: Palliative consult      CURRENT MEDICATIONS     Current Outpatient Medications:  dexamethasone 2 MG Oral Tab Take 1 tablet (2 mg total) by mouth 2 (two) times daily with ella heartburn  :no dysuria, urgency or frequency; no vaginal discharge; no urinary incontinence; no hematuria  MUSCULOSKELETAL:---generalized weakness  NEURO:no sensory or motor complaint, denies seizures, denies vertigo, denies tinnitus and denies tremors Weakness/OA  1. PT/OT to evaluate and treat  2. Tylenol 650 mg q6h prn for fever/pain, if given for fever, notify MD/NP  3. Decadron 2 mg bid  4. Famotidine 20 mg bid  5. Palliative Consult  6. Fall precautions  7. ELOS:  12-14 days  8.  Anticipated dischar

## 2018-10-24 NOTE — PATIENT INSTRUCTIONS
POST-RADIATION INSTRUCTIONS:   - CALL [phone] FOR A FOLLOW-UP WITH DR. BIANCHI IN ONE MONTH  - YOU WILL WEAN OFF THE DECADRON (STEROID MEDICATION) AS FOLLOWS STARTING THE DAY AFTER YOU FINISH RADIATION:     TAKE DECADRON 2 MG DAILY X 3 DAYS THEN STOP

## 2018-10-30 NOTE — TELEPHONE ENCOUNTER
TC to [address] daughter. After 8 Gy x 2 to the brain met, the [facility] unexpectedly is down for estimated 2 weeks. I do not want to wait that long to deliver her final 8 Gy.     I suggested treatment in [place], but with a 5 Gy x 2, [facility]

## 2018-10-31 NOTE — PROGRESS NOTES
[facility] Radiation Treatment Management Note 1-5    Patient:  Ceasar Champagne  Age:  80year old  Visit Diagnosis:  No diagnosis found.   Primary Rad/Onc:  Dr. Glynn Lorenzo    Site Delivered Dose (Gy) Prescribed Dose (Gy) Fraction

## 2018-11-02 NOTE — PROGRESS NOTES
[facility] Radiation Treatment Management Note 1-5    Patient:  Yanely Fierro  Age:  80year old  Visit Diagnosis:    1.  Brain metastasis ([facility]      Primary Rad/Onc:  Dr. Raghavendra Rossi    Site Delivered Dose (Gy) Prescribed Dose (

## 2018-12-07 NOTE — PROGRESS NOTES
[facility] RADIATION ONCOLOGY FOLLOW UP    PATIENT:   Cara Cruz      12/20/1929    DIAGNOSIS:   Brain metastasis      CANCER HISTORY:  69-year-old woman with COPD, macular degeneration, remote history of non-Hodgkin lymphoma cured with chemo in coordination of care.

## 2018-12-07 NOTE — PATIENT INSTRUCTIONS
- CALL [phone] FOR A FOLLOW-UP WITH DR. Hermilo Sales in 3 months  - CALL [phone] TO SCHEDULE your CT chest/abdomen/pelvis & MRI of the brain for this month  - CALL IF YOU HAVE ANY QUESTIONS/CONCERNS REGARDING RADIATION THERAPY: 21

## 2018-12-07 NOTE — PROGRESS NOTES
Nursing Follow-Up Note    Patient: Tamika Marmolejo  YOB: 1929  Age: 80year old  Radiation Oncologist: Dr. Doreatha Meckel  Referring Physician: No ref. provider found  Chief Complaint: Patient presents with:   Follow - Up    Date: 12/7/2018

## 2018-12-24 NOTE — PROGRESS NOTES
Discussed her situation with MELANIE Bernal.  Patient and family are good candidates for home hospice.

## 2018-12-25 PROBLEM — J18.9 COMMUNITY ACQUIRED PNEUMONIA: Status: ACTIVE | Noted: 2018-01-01

## 2018-12-25 PROBLEM — J18.9 COMMUNITY ACQUIRED PNEUMONIA, UNSPECIFIED LATERALITY: Status: ACTIVE | Noted: 2018-01-01

## 2018-12-26 NOTE — PROGRESS NOTES
[facility]PITAL[facility]  Progress Note     Manda Schwab Patient Status:  Inpatient    1929 MRN [id number]   [facility] 5NW-A Attending Flex Akhtar MD   Hosp Day # 1 PCP Donald Rashid DO     Chief Complaint: SOB    S: Patient  An • Umeclidinium Bromide  1 puff Inhalation Daily   • cefTRIAXone  1 g Intravenous Q24H   • azithromycin  500 mg Intravenous Q24H   • Heparin Sodium (Porcine)  5,000 Units Subcutaneous Q8H [facility]       ASSESSMENT / PLAN:     1. Pneumonia  2. Lung cancer  3.

## 2018-12-26 NOTE — H&P
[facility] HOSPITAL[facility]  History and Physical     Ez Galindo Patient Status:  Emergency    1929 MRN [id number]   Location [facility] Attending WillKassie MD   Hosp Day # 0 PCP Flor Blanco DO     Chief Compla ENDOBRONCHIAL ULTRASOUND (EBUS) N/A 5/8/2018    Performed by Ashlee Rivas MD at [facility] ENDOSCOPY   • FEMUR IM NAIL Right 2/5/2017    Performed by Joyce Stauffer MD at [facility] MAIN OR   • OTHER Right     Above the knee surgical repair of fracture ( honey)   • OTHER Tiotropium Bromide Monohydrate 18 MCG Inhalation Cap Inhale 18 mcg into the lungs daily. Disp:  Rfl:    Albuterol Sulfate  (90 Base) MCG/ACT Inhalation Aero Soln Inhale 2 puffs into the lungs every 4 (four) hours as needed for Wheezing.    Disp: (based on SCr of 0.77 mg/dL). No results for input(s): PTP, INR in the last 168 hours. Recent Labs   Lab  12/25/18   1828   TROP  <0.046       Imaging: Imaging data reviewed in Epic. ASSESSMENT / PLAN:     1. Pneumonia  1.  Continue IV abx for no

## 2018-12-26 NOTE — TELEPHONE ENCOUNTER
Patient has not been seen by Grady Luz, physician assistant, for over 12 months. Please forward this message to Grady Cassette. If the hospice is secondary or due to the COPD, recommend that the patient's pulmonologist be contacted.

## 2018-12-26 NOTE — TELEPHONE ENCOUNTER
Spoke to Demetris and explained that because pt has not been seen she would need to make an appt with Onel Mtz. Demetris states that it is not COPD but pt has brain mets. Explained will discuss with Onel Mtz tomorrow as well.

## 2018-12-26 NOTE — ED NOTES
Pt given zofran prior to arrival and pt now denies abdominal pain and nausea. Pt states she is on oxygen at night but normally goes to bed around this time. Pt states she does have a cough but is unsure what it looks like.  Pt lives at home with her son and

## 2018-12-26 NOTE — CM/SW NOTE
12/26/18 1400   CM/SW Referral Data   Referral Source Physician   Reason for Referral Discharge planning   Informant Patient; Children   Patient Info   Patient's Mental Status Alert   Patient's Home Environment House   Number of Levels in [address] [facility]

## 2018-12-26 NOTE — PLAN OF CARE
New admission at 2115  A/ox4, vss on 3L nc, normally room air during the day and 2L noc. Denies pain or nausea at this time. Lungs diminished throughout with rhonchi to bilateral bases. Non-productive cough that is chronic per pt 2/2 copd/current smoker.

## 2018-12-26 NOTE — CM/SW NOTE
Spoke to [facility] RN. Patient and her family had been set to discuss hospice today but daughter is unable to meet today. Tentatively hospice meeting appointed for Friday at 1200.  Anya Villar states patient does not need to be in hospital for this me

## 2018-12-26 NOTE — PROGRESS NOTES
[facility] Pharmacy Note:  Renal Dose Adjustment    Manda Schwab has been prescribed famotidine (PEPCID) 20 mg orally BID. Estimated Creatinine Clearance: 33.2 mL/min (based on SCr of 0.77 mg/dL).     Her calculated creatinine clearance is <50 ml/min, ther

## 2018-12-26 NOTE — ED NOTES
Per pt's daughter, Jitendra Barnes, pt has had some intermittent abdominal pain since going  Through radiation in November. Pt has been taking famotidine at home. Per daughter pt has extensive history of COPD, normally has some dyspnea.  Can contact Herlindagaldino Cameron at

## 2018-12-26 NOTE — PROGRESS NOTES
SPO2 % ON ROOM AIR 94%    SPO2% AMBULATION ON ROOM AIR 87%    SPO2% AMBULATION ON O2 2 LITERS PER MINUTE  92 %

## 2018-12-26 NOTE — ED PROVIDER NOTES
Patient Seen in: [facility] Emergency Department    History   Patient presents with:  Abdomen/Flank Pain (GI/)  Dyspnea GAURI SOB (respiratory)    Stated Complaint: abdominal pain/dyspnea    HPI    The patient is an 40-year-old female with a long hi Chanell Nuno MD at [facility] MAIN OR   • OTHER Right     Above the knee surgical repair of fracture ( honey)   • OTHER SURGICAL HISTORY  2009    right tumor behind ear removed Lymphoma   • OTHER SURGICAL HISTORY  08/2013    lymphoma in the groin treated with Radiation coarse rhonchi bilaterally, few mild crackles in the right lower lung base. Abdomen: Normoactive bowel sounds. Soft, nondistended. No HSM or masses. Mild diffuse abdominal tenderness, that is poorly localized. No rebound or guarding. No CVA tenderness. On arrival of the patient an EKG was obtained which showed no acute process. The patient was placed on continuous pulse oximetry and cardiac telemetry. She was found to be hypoxic. This corrected with nasal cannula oxygen.   Blood was obtained and respect L4 and L4 with respect L5 with facet joint arthropathy. Degenerative disc disease with complete   obliteration at the lumbar sacral level. Postsurgical changes with hardware involve the right hip.     LUNG BASES:  Scattered increased interstitial

## 2018-12-26 NOTE — ED INITIAL ASSESSMENT (HPI)
Pt presents to the ED via EMS with complaints of abdominal pain for past few weeks, feeling short of breath today. Pt has history of tumors in the lung and brain and is supposed to go on hospice tomorrow.  Pt awake and alert, skin w/d,resps reg/slightly lab

## 2018-12-27 NOTE — PROGRESS NOTES
[facility]PITAL[facility]  Progress Note     Quan Mendieta Patient Status:  Inpatient    1929 MRN [id number]   [facility] 5NW-A Attending Jojo Carroll MD   Hosp Day # 2 PCP Tavon Escobar DO     Chief Complaint: SOB    S: Patient sad OCUVITE-LUTEIN  2 tablet Oral Daily   • Umeclidinium Bromide  1 puff Inhalation Daily   • cefTRIAXone  1 g Intravenous Q24H   • azithromycin  500 mg Intravenous Q24H   • Heparin Sodium (Porcine)  5,000 Units Subcutaneous Q8H [facility]       ASSESSMENT / PLAN:

## 2018-12-27 NOTE — TELEPHONE ENCOUNTER
Spoke to Dimple and she already has the order the from Dr. Kole Albert but that he was not going to follow pt so they still might need the orders to be signed by our office so she states she will be faxing over orders.

## 2018-12-27 NOTE — PHYSICAL THERAPY NOTE
PHYSICAL THERAPY QUICK EVALUATION - INPATIENT    Room Number: 366/818-P  Evaluation Date: 12/27/2018  Presenting Problem: SOB  Physician Order: PT Eval and Treat    Problem List  Principal Problem:    Community acquired pneumonia, unspecified laterality Macy Hogan  Type of Home: House   Home Layout: Two level        Stairs to [employer]: (chair lift)       Lives With: Son(and grandson)  Drives: No  Patient Owned Equipment: Rolling walker       Prior Level of [place]: Pt has a Rolling walker  Pattern: Shuffle          Skilled Therapy Provided: Pt received seated in chair, agreeable to PT. Pt demos sit to/from stand with supervision. Ambulation completed x50ft with supervision and RW.   Pt on 3L O2 for ambulation, some SOB noted

## 2018-12-27 NOTE — PROGRESS NOTES
[facility] RADIATION ONCOLOGY  TREATMENT SUMMARY    PATIENT:  Aracely Kita MD: Dr. Roderick Santizo    DIAGNOSIS:  Brain metastases    CANCER HISTORY:  80-year-old frail woman with non-small cell lung cancer who completed palliative t

## 2018-12-27 NOTE — PLAN OF CARE
Impaired Functional Mobility    • Achieve highest/safest level of mobility/gait Progressing        PAIN - ADULT    • Verbalizes/displays adequate comfort level or patient's stated pain goal Progressing        Patient/Family Goals    • Patient/Family Long T initial encounter     Contusion of right chest wall     Urinary tract infection without hematuria, site unspecified     Uterovaginal prolapse, incomplete     Non-small cell lung cancer ([facility]     Personal history of antineoplastic chemotherapy     Personal

## 2018-12-28 NOTE — CM/SW NOTE
MSW spoke with RN. Patient will dc home tomorrow by family car. Family will bring patient's 02 for transport to home. [facility] will meet family at home tomorrow to sign consents for hospice.       [facility]:  [address]

## 2018-12-28 NOTE — PROGRESS NOTES
[facility]PITAL[facility]  Progress Note     Mis Aguero Patient Status:  Inpatient    1929 MRN [id number]   [facility] 5NW-A Attending Allyssa Doherty MD   [facility] Day # 3 PCP Fransisca Arreola DO     Chief Complaint: SOB    S: Patient sit Umeclidinium Bromide  1 puff Inhalation Daily   • cefTRIAXone  1 g Intravenous Q24H   • Heparin Sodium (Porcine)  5,000 Units Subcutaneous Q8H [address]       ASSESSMENT / PLAN:     1. Pneumonia  2. Lung cancer  3. COPD with acute exacerbation  4.  Hypertension

## 2018-12-28 NOTE — PLAN OF CARE
DISCHARGE PLANNING    • Discharge to home or other facility with appropriate resources Progressing        GASTROINTESTINAL - ADULT    • Minimal or absence of nausea and vomiting Progressing        Impaired Functional Mobility    • Achieve highest/safest le

## 2018-12-28 NOTE — PLAN OF CARE
DISCHARGE PLANNING    • Discharge to home or other facility with appropriate resources Progressing        GASTROINTESTINAL - ADULT    • Minimal or absence of nausea and vomiting Progressing        Impaired Functional Mobility    • Achieve highest/safest le urinalysis     Contusion of right side of back, initial encounter     Contusion of right chest wall     Urinary tract infection without hematuria, site unspecified     Uterovaginal prolapse, incomplete     Non-small cell lung cancer [facility]     Personal hist

## 2018-12-29 NOTE — DISCHARGE SUMMARY
[facility] HOSPITALIST  DISCHARGE SUMMARY     Magali Maynard Patient Status:  Inpatient    1929 MRN [id number]   [facility] 5NW-A Attending Benjie Carroll MD   [facility] Day # 4 PCP Montserrat Franklin DO     Date of Admission: 2018  [place] List:     Discharge Medications      START taking these medications      Instructions Prescription details   levofloxacin 500 MG Tabs  Commonly known as:  LEVAQUIN  Start taking on:  12/30/2018      Take 1 tablet (500 mg total) by mouth daily for 2 days. nontender, nondistended. Positive bowel sounds. No rebound or guarding. Neurologic: No focal neurological deficits. Musculoskeletal: Moves all extremities. Extremities: No edema.   -----------------------------------------------------------------------

## 2018-12-29 NOTE — PLAN OF CARE
RESPIRATORY - ADULT    • Achieves optimal ventilation and oxygenation Adequate for Discharge          DISCHARGE PLANNING    • Discharge to home or other facility with appropriate resources Adequate for Discharge          PROBLEM: PNA     ASSESSMENT: Pt is

## 2018-12-29 NOTE — PROGRESS NOTES
NURSING DISCHARGE NOTE    Discharged Home via Wheelchair. Accompanied by Family member  Belongings Taken by patient/family. Discharge instructions, prescribed medications, and follow-up instructions were discussed with pt and daughter.  Pt and anastasia

## 2019-01-01 ENCOUNTER — HOSPITAL ENCOUNTER (EMERGENCY)
Facility: HOSPITAL | Age: 84
Discharge: HOME | End: 2019-01-01
Attending: EMERGENCY MEDICINE
Payer: MEDICARE

## 2019-01-01 ENCOUNTER — TELEPHONE (OUTPATIENT)
Dept: FAMILY MEDICINE CLINIC | Facility: CLINIC | Age: 84
End: 2019-01-01

## 2019-01-01 ENCOUNTER — APPOINTMENT (OUTPATIENT)
Dept: GENERAL RADIOLOGY | Facility: HOSPITAL | Age: 84
End: 2019-01-01
Attending: EMERGENCY MEDICINE
Payer: MEDICARE

## 2019-01-01 VITALS
OXYGEN SATURATION: 93 % | TEMPERATURE: 100 F | DIASTOLIC BLOOD PRESSURE: 70 MMHG | BODY MASS INDEX: 18.4 KG/M2 | WEIGHT: 93.69 LBS | HEART RATE: 94 BPM | RESPIRATION RATE: 29 BRPM | SYSTOLIC BLOOD PRESSURE: 146 MMHG | HEIGHT: 60 IN

## 2019-01-01 DIAGNOSIS — Z51.5 HOSPICE CARE: ICD-10-CM

## 2019-01-01 DIAGNOSIS — J44.1 COPD EXACERBATION (HCC): Primary | ICD-10-CM

## 2019-01-01 LAB
ALBUMIN SERPL-MCNC: 3.2 G/DL (ref 3.1–4.5)
ALBUMIN/GLOB SERPL: 0.9 {RATIO} (ref 1–2)
ALP LIVER SERPL-CCNC: 212 U/L (ref 55–142)
ALT SERPL-CCNC: 158 U/L (ref 14–54)
ANION GAP SERPL CALC-SCNC: 8 MMOL/L (ref 0–18)
AST SERPL-CCNC: 401 U/L (ref 15–41)
ATRIAL RATE: 92 BPM
BASOPHILS # BLD AUTO: 0.03 X10(3) UL (ref 0–0.1)
BASOPHILS NFR BLD AUTO: 0.2 %
BILIRUB SERPL-MCNC: 0.5 MG/DL (ref 0.1–2)
BUN BLD-MCNC: 24 MG/DL (ref 8–20)
BUN/CREAT SERPL: 27.9 (ref 10–20)
CALCIUM BLD-MCNC: 9.1 MG/DL (ref 8.3–10.3)
CHLORIDE SERPL-SCNC: 95 MMOL/L (ref 101–111)
CO2 SERPL-SCNC: 30 MMOL/L (ref 22–32)
CREAT BLD-MCNC: 0.86 MG/DL (ref 0.55–1.02)
EOSINOPHIL # BLD AUTO: 0.01 X10(3) UL (ref 0–0.3)
EOSINOPHIL NFR BLD AUTO: 0.1 %
ERYTHROCYTE [DISTWIDTH] IN BLOOD BY AUTOMATED COUNT: 15.1 % (ref 11.5–16)
GLOBULIN PLAS-MCNC: 3.6 G/DL (ref 2.8–4.4)
GLUCOSE BLD-MCNC: 78 MG/DL (ref 70–99)
HCT VFR BLD AUTO: 38.8 % (ref 34–50)
HGB BLD-MCNC: 12.4 G/DL (ref 12–16)
IMMATURE GRANULOCYTE COUNT: 0.06 X10(3) UL (ref 0–1)
IMMATURE GRANULOCYTE RATIO %: 0.4 %
LYMPHOCYTES # BLD AUTO: 0.65 X10(3) UL (ref 0.9–4)
LYMPHOCYTES NFR BLD AUTO: 4 %
M PROTEIN MFR SERPL ELPH: 6.8 G/DL (ref 6.4–8.2)
MCH RBC QN AUTO: 27.4 PG (ref 27–33.2)
MCHC RBC AUTO-ENTMCNC: 32 G/DL (ref 31–37)
MCV RBC AUTO: 85.8 FL (ref 81–100)
MONOCYTES # BLD AUTO: 1.64 X10(3) UL (ref 0.1–1)
MONOCYTES NFR BLD AUTO: 10.2 %
NEUTROPHIL ABS PRELIM: 13.69 X10 (3) UL (ref 1.3–6.7)
NEUTROPHILS # BLD AUTO: 13.69 X10(3) UL (ref 1.3–6.7)
NEUTROPHILS NFR BLD AUTO: 85.1 %
OSMOLALITY SERPL CALC.SUM OF ELEC: 279 MOSM/KG (ref 275–295)
P AXIS: 61 DEGREES
P-R INTERVAL: 144 MS
PLATELET # BLD AUTO: 240 10(3)UL (ref 150–450)
POTASSIUM SERPL-SCNC: 4 MMOL/L (ref 3.6–5.1)
Q-T INTERVAL: 352 MS
QRS DURATION: 70 MS
QTC CALCULATION (BEZET): 435 MS
R AXIS: 42 DEGREES
RBC # BLD AUTO: 4.52 X10(6)UL (ref 3.8–5.1)
RED CELL DISTRIBUTION WIDTH-SD: 47.5 FL (ref 35.1–46.3)
SODIUM SERPL-SCNC: 133 MMOL/L (ref 136–144)
T AXIS: 59 DEGREES
VENTRICULAR RATE: 92 BPM
WBC # BLD AUTO: 16.1 X10(3) UL (ref 4–13)

## 2019-01-01 PROCEDURE — 71045 X-RAY EXAM CHEST 1 VIEW: CPT | Performed by: EMERGENCY MEDICINE

## 2019-01-01 PROCEDURE — 96361 HYDRATE IV INFUSION ADD-ON: CPT

## 2019-01-01 PROCEDURE — 36415 COLL VENOUS BLD VENIPUNCTURE: CPT

## 2019-01-01 PROCEDURE — 93010 ELECTROCARDIOGRAM REPORT: CPT

## 2019-01-01 PROCEDURE — 99285 EMERGENCY DEPT VISIT HI MDM: CPT

## 2019-01-01 PROCEDURE — 80053 COMPREHEN METABOLIC PANEL: CPT | Performed by: EMERGENCY MEDICINE

## 2019-01-01 PROCEDURE — 85025 COMPLETE CBC W/AUTO DIFF WBC: CPT | Performed by: EMERGENCY MEDICINE

## 2019-01-01 PROCEDURE — 94644 CONT INHLJ TX 1ST HOUR: CPT

## 2019-01-01 PROCEDURE — 87040 BLOOD CULTURE FOR BACTERIA: CPT | Performed by: EMERGENCY MEDICINE

## 2019-01-01 PROCEDURE — 96374 THER/PROPH/DIAG INJ IV PUSH: CPT

## 2019-01-01 PROCEDURE — 93005 ELECTROCARDIOGRAM TRACING: CPT

## 2019-01-01 PROCEDURE — 94640 AIRWAY INHALATION TREATMENT: CPT

## 2019-01-01 RX ORDER — METHYLPREDNISOLONE SODIUM SUCCINATE 125 MG/2ML
125 INJECTION, POWDER, LYOPHILIZED, FOR SOLUTION INTRAMUSCULAR; INTRAVENOUS ONCE
Status: COMPLETED | OUTPATIENT
Start: 2019-01-01 | End: 2019-01-01

## 2019-01-01 RX ORDER — ACETAMINOPHEN 500 MG
1000 TABLET ORAL ONCE
Status: COMPLETED | OUTPATIENT
Start: 2019-01-01 | End: 2019-01-01

## 2019-01-02 NOTE — CM/SW NOTE
01/02/19 0900   Discharge disposition   Expected discharge disposition Hospice/Home   [facility]   Discharge transportation Private car     Patient discharged on 12/29/2018 as previously planned.

## 2019-01-03 NOTE — TELEPHONE ENCOUNTER
The patient's \"Hospice Certification and Plan of Care\" and \"Verbal Order,\" which were signed by Leta Browne PA-C and cosigned by Berenice Pallas, D.O., were successfully faxed to [facility] at [phone].

## 2019-01-11 NOTE — ED INITIAL ASSESSMENT (HPI)
Pt presents to the ED accompanied by EMS with complaints of feeling generalized weakness and shortness of breath for few days. Pt is on oxygen at night but was wearing it upon arrival of ems. Pt denies chest pain. Pt given 1 neb treatment pta.   Pt awake an

## 2019-01-11 NOTE — ED NOTES
Pt awake and alert, appears comfortable. Skin appears flushed, so will recheck temp.  Family and  at bedside,

## 2019-01-11 NOTE — ED NOTES
MD at bedside speaking with pt and family regarding possible discharge. MD made aware pt with fever and states ok to administer tylenol.

## 2019-01-11 NOTE — ED PROVIDER NOTES
Patient Seen in: [facility] Emergency Department    History   Patient presents with:  Dyspnea GAURI SOB (respiratory)  Fatigue (constitutional, neurologic)    Stated Complaint: dyspnea, generalized weakness    HPI    Patient is an unfortunate 89-year- fracture ( honey)   • OTHER SURGICAL HISTORY  2009    right tumor behind ear removed Lymphoma   • OTHER SURGICAL HISTORY  08/2013    lymphoma in the groin treated with Radiation   • OTHER SURGICAL HISTORY  01/16/2018    Cysto- Dr. Chiara Hayes A/G Ratio 0.9 (*)     All other components within normal limits   CBC W/ DIFFERENTIAL - Abnormal; Notable for the following components:    WBC 16.1 (*)     RDW-SD 47.5 (*)     Neutrophil Absolute Prelim 13.69 (*)     Neutrophil Absolute 13.69 (*)     Ly Clinical Impression:  COPD exacerbation ([facility]  (primary encounter diagnosis)  Hospice care    Disposition:  Discharge  1/11/2019 12:49 pm    Follow-up:  Pk Shaikh, [address] [facility]

## 2019-01-11 NOTE — ED NOTES
Pt assisted to commode and assisted with dressing. Pt's daughter at bedside. Pt has been off oxygen for greater than 5 minutes and sats remain 94% with rate of 29.  Pt's daughter feels comfortable taking pt home at this time and will place pt on her oxygen

## 2019-01-11 NOTE — ED NOTES
RT notified of need for continuous. Pt noted to have dyspnea with exertion in bed, getting changed into gown.  Pt does recover to slight dyspnea quickly with rest.

## 2019-01-11 NOTE — ED NOTES
Pt states she feels comfortable with discharge at this time. resps appear slightly labored as they have been. Pt wheeled to car to daughter for transport home. Belongings sent with daughter.

## 2019-10-03 NOTE — PROGRESS NOTES
Bacterial Conjunctivitis  Bacterial conjunctivitis is an infection of the clear membrane that covers the white part of your eye and the inner surface of your eyelid (conjunctiva). When the blood vessels in your conjunctiva become inflamed, your eye becomes red or pink, and it will probably feel itchy. Bacterial conjunctivitis spreads very easily from person to person (is contagious). It also spreads easily from one eye to the other eye.  What are the causes?  This condition is caused by several common bacteria. You may get the infection if you come into close contact with another person who is infected. You may also come into contact with items that are contaminated with the bacteria, such as a face towel, contact lens solution, or eye makeup.  What increases the risk?  This condition is more likely to develop in people who:  · Are exposed to other people who have the infection.  · Wear contact lenses.  · Have a sinus infection.  · Have had a recent eye injury or surgery.  · Have a weak body defense system (immune system).  · Have a medical condition that causes dry eyes.  What are the signs or symptoms?  Symptoms of this condition include:  · Eye redness.  · Tearing or watery eyes.  · Itchy eyes.  · Burning feeling in your eyes.  · Thick, yellowish discharge from an eye. This may turn into a crust on the eyelid overnight and cause your eyelids to stick together.  · Swollen eyelids.  · Blurred vision.  How is this diagnosed?  Your health care provider can diagnose this condition based on your symptoms and medical history. Your health care provider may also take a sample of discharge from your eye to find the cause of your infection. This is rarely done.  How is this treated?  Treatment for this condition includes:  · Antibiotic eye drops or ointment to clear the infection more quickly and prevent the spread of infection to others.  · Oral antibiotic medicines to treat infections that do not respond to drops or  Ez Galindo is a 80year old female. HPI:     #1 COPD severe  Patient is in for medication refill on Spiriva and general follow-up.   Patient sees Dr. Tamara Conner for COPD which is severe uses oxygen at night had a recent pulmonary nodule and is going to b 7/06/2017 CT abdomen and chest     1. New spiculated mass in the superior lingula highly concerning for malignancy. A satellite nodule seen just peripherally and inferiorly.      2. Mild to moderate emphysema with mucous plugging in the lower lobes may be ointments, or last longer than 10 days.  · Cool, wet cloths (cool compresses) placed on the eyes.  · Artificial tears applied 2-6 times a day.  Follow these instructions at home:  Medicines  · Take or apply your antibiotic medicine as told by your health care provider. Do not stop taking or applying the antibiotic even if you start to feel better.  · Take or apply over-the-counter and prescription medicines only as told by your health care provider.  · Be very careful to avoid touching the edge of your eyelid with the eye drop bottle or the ointment tube when you apply medicines to the affected eye. This will keep you from spreading the infection to your other eye or to other people.  Managing discomfort  · Gently wipe away any drainage from your eye with a warm, wet washcloth or a cotton ball.  · Apply a cool, clean washcloth to your eye for 10-20 minutes, 3-4 times a day.  General instructions  · Do not wear contact lenses until the inflammation is gone and your health care provider says it is safe to wear them again. Ask your health care provider how to sterilize or replace your contact lenses before you use them again. Wear glasses until you can resume wearing contacts.  · Avoid wearing eye makeup until the inflammation is gone. Throw away any old eye cosmetics that may be contaminated.  · Change or wash your pillowcase every day.  · Do not share towels or washcloths. This may spread the infection.  · Wash your hands often with soap and water. Use paper towels to dry your hands.  · Avoid touching or rubbing your eyes.  · Do not drive or use heavy machinery if your vision is blurred.  Contact a health care provider if:  · You have a fever.  · Your symptoms do not get better after 10 days.  Get help right away if:  · You have a fever and your symptoms suddenly get worse.  · You have severe pain when you move your eye.  · You have facial pain, redness, or swelling.  · You have sudden loss of vision.  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 Make sure you discuss any questions you have with your health care provider.  Document Released: 12/18/2006 Document Revised: 04/27/2017 Document Reviewed: 09/29/2016  [employer] Interactive Patient Education © 2017 [employer]     complication, without long-term current use of insulin (hcc)      Orders Placed This Encounter      mmm vit d      mmm cbc      mmm cmp      mmm TSH and Free T4 [E]      mmm HBA1C      mmm urine Microalb/Creat Ratio, Random    Meds & Refills for this Visit eye exams yearly is legally blind due to macular degeneration  - HEMOGLOBIN A1C; Future  - MICROALB/CREAT RATIO, RANDOM URINE; Future    The patient indicates understanding of these issues and agrees to the plan.   The patient is asked to return in as neede

## 2019-10-15 NOTE — PROGRESS NOTES
MELANIE completed a hospice referral to [facility]. All pertinent information has been faxed. MELANIE talked with daughter Cheo Vitale who is the . Isidro Phalen will contact her today.
not applicable

## 2020-08-26 NOTE — INTERVAL H&P NOTE
Pre-op Diagnosis: LYMPHADENOPATHY,LUNG NODULE      The above referenced H&P was reviewed by Joselyn Way MD on 5/8/2018, the patient was examined and clinical history reviewed.  Previously on 4/19/18, she was seen in preop and found to have COPD exacerba
37

## 2022-08-19 NOTE — Clinical Note
Preventive Care Visit  [facility] LOUISE Roberts MD, Internal Medicine - Pediatrics  Aug 19, 2022  Assessment & Plan   9 year old 0 month old, here for preventive care.    Has IEP , had seizures.  Basic motor function and speech  Took years .tummy issues   seizures and ADHD  Stopped seizure and medications     Partial seizure, half conscious and     Gut issue get worse dairy free and gluten free  3 years - speech delay     4th grade doing well, come a long way    Reintroducing dairy   Was getting camel milk ( helped tummy)  Now on the [employer]   Psychologist ( formal diagnosis )   Was seeing wholistic medications      Ladan was seen today for well child.    Diagnoses and all orders for this visit:    Encounter for routine child health examination w/o abnormal findings  -     BEHAVIORAL/EMOTIONAL ASSESSMENT (86193)  -     SCREENING TEST, PURE TONE, AIR ONLY  -     SCREENING, VISUAL ACUITY, QUANTITATIVE, BILAT    Attention deficit hyperactivity disorder (ADHD), combined type  -     Discontinue: guanFACINE (INTUNIV) 1 MG TB24 24 hr tablet; Take 1 tablet (1 mg) by mouth every morning  -     guanFACINE (INTUNIV) 1 MG TB24 24 hr tablet; Take 1 tablet (1 mg) by mouth every morning        Growth      Normal height and weight    Immunizations   Vaccines up to date.    Anticipatory Guidance    Reviewed age appropriate anticipatory guidance.     Praise for positive activities    Encourage reading    Social media    Limit / supervise TV/ media    Chores/ expectations    Limits and consequences    Healthy snacks    Calcium and iron sources    Physical activity    Body changes with puberty    Smoking exposure    Sunscreen/ insect repellent    Referrals/Ongoing Specialty Care  Verbal referral for routine dental care  Dental Fluoride Varnish:   Yes, fluoride varnish application risks and benefits were discussed, and verbal consent was received.    Follow Up      No follow-ups on file.    Subjective  Pt will be coming in for HFU appt on 8/23/18. Thank you!     Additional Questions 8/19/2022   Accompanied by Mom   Questions for today's visit No   Surgery, major illness, or injury since last physical No     Social 8/19/2022   Lives with Parent(s), Sibling(s)   Recent potential stressors None   Lack of transportation has limited access to appts/meds Decline   Difficulty paying mortgage/rent on time Patient refused   Lack of steady place to sleep/has slept in a shelter Patient refused   (!) HOUSING CONCERN PRESENT (!) TRANSPORTATION CONCERN PRESENT  Health Risks/Safety 8/19/2022   What type of car seat does your child use? Seat belt only   Where does your child sit in the car?  Back seat   Do you have a swimming pool? No   Is your child ever home alone?  No   Do you have guns/firearms in the home? (!) YES   Are the guns/firearms secured in a safe or with a trigger lock? Yes   Is ammunition stored separately from guns? Yes     TB Screening 8/19/2022   Was your child born outside of the [place]? No     TB Screening: Consider immunosuppression as a risk factor for TB 8/19/2022   Recent TB infection or positive TB test in family/close contacts No   Recent travel outside [place] (child/family/close contacts) No   Recent residence in high-risk group setting (correctional facility/health care facility/homeless shelter/refugee camp) No      Dyslipidemia Screening 8/19/2022   Parent/grandparent with stroke or heart attack No   Parent with hyperlipidemia No     Dental Screening 8/19/2022   Has your child seen a dentist? Yes   When was the last visit? (!) OVER 1 YEAR AGO   Has your child had cavities in the last 3 years? No   Have parents/caregivers/siblings had cavities in the last 2 years? No     Diet 8/19/2022   Do you have questions about feeding your child? No   What does your child regularly drink? Water   What type of water? (!) FILTERED   How often does your family eat meals together? Every day   How many snacks does your child eat per day 1   Are there types of foods your  "child won't eat? (!) YES   Please specify: Dairy   At least 3 servings of food or beverages that have calcium each day Yes   In past 12 months, concerned food might run out (!) DECLINE   In past 12 months, food has run out/couldn't afford more (!) DECLINE     Elimination 8/19/2022   Bowel or bladder concerns? No concerns     Activity 8/19/2022   Days per week of moderate/strenuous exercise 7 days   On average, how many minutes does your child engage in exercise at this level? 60 minutes   What does your child do for exercise?  Plays soccer and basketball   What activities is your child involved with?  Sunday school     Media Use 8/19/2022   Hours per day of screen time (for entertainment) 1   Screen in bedroom (!) YES     Sleep 8/19/2022   Do you have any concerns about your child's sleep?  No concerns, sleeps well through the night     School 8/19/2022   School concerns No concerns   Grade in school 4th Grade   Current school [employer]   School absences (>2 days/mo) (!) YES   Concerns about friendships/relationships? No     Vision/Hearing 8/19/2022   Vision or hearing concerns No concerns     Development / Social-Emotional Screen 8/19/2022   Developmental concerns (!) INDIVIDUAL EDUCATIONAL PROGRAM (IEP)     Mental Health - PSC-17 required for C&TC  Screening:    PSC-17 PASS (<15 pass), no follow up necessary    No concerns         Objective     Exam  /58 (BP Location: Right arm, Patient Position: Chair, Cuff Size: Adult Small)   Pulse 68   Temp 98.2  F (36.8  C)   Ht 1.397 m (4' 7\")   Wt 33.6 kg (74 lb)   SpO2 100%   BMI 17.20 kg/m    85 %ile (Z= 1.02) based on CDC (Girls, 2-20 Years) Stature-for-age data based on Stature recorded on 8/19/2022.  76 %ile (Z= 0.71) based on CDC (Girls, 2-20 Years) weight-for-age data using vitals from 8/19/2022.  65 %ile (Z= 0.39) based on CDC (Girls, 2-20 Years) BMI-for-age based on BMI available as of 8/19/2022.  Blood pressure percentiles are 91 % systolic " and 44 % diastolic based on the 2017 AAP Clinical Practice Guideline. This reading is in the elevated blood pressure range (BP >= 90th percentile).    Vision Screen  Vision Screen Details  Does the patient have corrective lenses (glasses/contacts)?: No  No Corrective Lenses, PLUS LENS REQUIRED: Pass  Vision Acuity Screen  Vision Acuity Tool: Conley  RIGHT EYE: 10/12.5 (20/25)  LEFT EYE: 10/12.5 (20/25)  Is there a two line difference?: No  Vision Screen Results: Pass    Hearing Screen  RIGHT EAR  1000 Hz on Level 40 dB (Conditioning sound): Pass  1000 Hz on Level 20 dB: Pass  2000 Hz on Level 20 dB: Pass  4000 Hz on Level 20 dB: Pass  LEFT EAR  4000 Hz on Level 20 dB: Pass  2000 Hz on Level 20 dB: Pass  1000 Hz on Level 20 dB: Pass  500 Hz on Level 25 dB: Pass  RIGHT EAR  500 Hz on Level 25 dB: Pass  Results  Hearing Screen Results: Pass  Physical Exam  GENERAL: Active, alert, in no acute distress.  SKIN: Clear. No significant rash, abnormal pigmentation or lesions  HEAD: Normocephalic  EYES: Pupils equal, round, reactive, Extraocular muscles intact. Normal conjunctivae.  EARS: Normal canals. Tympanic membranes are normal; gray and translucent.  NOSE: Normal without discharge.  MOUTH/THROAT: Clear. No oral lesions. Teeth without obvious abnormalities.  NECK: Supple, no masses.  No thyromegaly.  LYMPH NODES: No adenopathy  LUNGS: Clear. No rales, rhonchi, wheezing or retractions  HEART: Regular rhythm. Normal S1/S2. No murmurs. Normal pulses.  ABDOMEN: Soft, non-tender, not distended, no masses or hepatosplenomegaly. Bowel sounds normal.   NEUROLOGIC: No focal findings. Cranial nerves grossly intact: DTR's normal. Normal gait, strength and tone  BACK: Spine is straight, no scoliosis.  EXTREMITIES: Full range of motion, no deformities  : Normal female external genitalia, Peña stage 1.   BREASTS:  Peña stage 1.  No abnormalities.     No Marfan stigmata: kyphoscoliosis, high-arched palate, pectus excavatuM,  arachnodactyly, arm span > height, hyperlaxity, myopia, MVP, aortic insufficieny)  Eyes: normal fundoscopic and pupils  Cardiovascular: normal PMI, simultaneous femoral/radial pulses, no murmurs (standing, supine, Valsalva)  Skin: no HSV, MRSA, tinea corporis  Musculoskeletal    Neck: normal    Back: normal    Shoulder/arm: normal    Elbow/forearm: normal    Wrist/hand/fingers: normal    Hip/thigh: normal    Knee: normal    Leg/ankle: normal    Foot/toes: normal    Functional (Single Leg Hop or Squat): normal      Screening Questionnaire for Pediatric Immunization    1. Is the child sick today?  No  2. Does the child have allergies to medications, food, a vaccine component, or latex? No  3. Has the child had a serious reaction to a vaccine in the past? No  4. Has the child had a health problem with lung, heart, kidney or metabolic disease (e.g., diabetes), asthma, a blood disorder, no spleen, complement component deficiency, a cochlear implant, or a spinal fluid leak?  Is he/she on long-term aspirin therapy? No  5. If the child to be vaccinated is 2 through 4 years of age, has a healthcare provider told you that the child had wheezing or asthma in the  past 12 months? No  6. If your child is a baby, have you ever been told he or she has had intussusception?  No  7. Has the child, sibling or parent had a seizure; has the child had brain or other nervous system problems?  No  8. Does the child or a family member have cancer, leukemia, HIV/AIDS, or any other immune system problem?  No  9. In the past 3 months, has the child taken medications that affect the immune system such as prednisone, other steroids, or anticancer drugs; drugs for the treatment of rheumatoid arthritis, Crohn's disease, or psoriasis; or had radiation treatments?  No  10. In the past year, has the child received a transfusion of blood or blood products, or been given immune (gamma) globulin or an antiviral drug?  No  11. Is the child/teen pregnant  or is there a chance that she could become  pregnant during the next month?  No  12. Has the child received any vaccinations in the past 4 weeks?  No     Immunization questionnaire answers were all negative.    [place] given to patient.      Screening performed by ANAYELI Roberts MD  [facility]

## 2023-02-14 NOTE — TELEPHONE ENCOUNTER
- See hyperbilirubinemia   A \"Physician Verbal Order,\" which was signed by Lul Harding PA-C and cosigned by Yeny Hillman D.O., was successfully faxed to Christ[facility] Frost.

## 2024-09-23 NOTE — CM/SW NOTE
Sina Gunn from [facility] and daughter Len Lyon discussed the return of patient back home with hospice. Discussed the plan of care from this point on and to call [facility] for any needs initially to avoid any miscommunication.  Pt has nebulizer
Pt is current with [facility] called to confirm.  Monisha Birch from [facility] is on her way here to speak to the patient. Notified Vear Omar (POA daughter) about the situation and she says that her sister Stone Victoria is on her way here.  Notified Dr. Silvestre Or
Him/He

## 2024-10-01 NOTE — TELEPHONE ENCOUNTER
Losartan approved qty 30 NR  Patient needs appt  Please call to schedule appt  [phone] (home) No

## (undated) DEVICE — LENS PLASTIC DISP EYEWEAR

## (undated) DEVICE — GLOVE SURG SENSICARE SZ 7-1/2

## (undated) DEVICE — BOWLS UTILITY 16OZ

## (undated) DEVICE — ENDOSCOPY PACK UPPER: Brand: MEDLINE INDUSTRIES, INC.

## (undated) DEVICE — PROXIMATE SKIN STAPLERS (35 WIDE) CONTAINS 35 STAINLESS STEEL STAPLES (FIXED HEAD): Brand: PROXIMATE

## (undated) DEVICE — NEBULIZER MICRO MIST W/TEE

## (undated) DEVICE — STERILE HOOK LOCK LATEX FREE ELASTIC BANDAGE 6INX5YD: Brand: HOOK LOCK™

## (undated) DEVICE — LOWER EXTREMITY CDS-LF: Brand: MEDLINE INDUSTRIES, INC.

## (undated) DEVICE — GAUZE SPONGES,12 PLY: Brand: CURITY

## (undated) DEVICE — Device

## (undated) DEVICE — MEDI-VAC NON-CONDUCTIVE SUCTION TUBING: Brand: CARDINAL HEALTH

## (undated) DEVICE — SINGLE USE BIOPSY VALVE MAJ-210: Brand: SINGLE USE BIOPSY VALVE (STERILE)

## (undated) DEVICE — FILTERLINE NASAL ADULT O2/CO2

## (undated) DEVICE — DRESSING AQUACEL AG 3.5 X 6

## (undated) DEVICE — SINGLE USE SUCTION VALVE MAJ-209: Brand: SINGLE USE SUCTION VALVE (STERILE)

## (undated) DEVICE — LENS FRAMES DISP EYEWEAR

## (undated) DEVICE — LAMINECTOMY ARM CRADLE FOAM POSITIONER: Brand: CARDINAL HEALTH

## (undated) DEVICE — 3M™ RED DOT™ MONITORING ELECTRODE WITH FOAM TAPE AND STICKY GEL, 50/BAG, 20/CASE, 72/PLT 2570: Brand: RED DOT™

## (undated) DEVICE — DRESSING AQUACEL AG 3.5X3.75

## (undated) DEVICE — 3M™ STERI-DRAPE™ U-DRAPE 1015: Brand: STERI-DRAPE™

## (undated) DEVICE — FLUIDGARD® 160 ANTI-FOG SURGICAL MASK WITH ANTI-GLARE SHIELD: Brand: PRECEPT ®

## (undated) DEVICE — ZIMMER® STERILE DISPOSABLE TOURNIQUET CUFF WITH PLC, DUAL PORT, SINGLE BLADDER, 34 IN. (86 CM)

## (undated) DEVICE — SOL  .9 3000ML

## (undated) DEVICE — KENDALL SCD EXPRESS SLEEVES, KNEE LENGTH, MEDIUM: Brand: KENDALL SCD

## (undated) DEVICE — 1200CC GUARDIAN II: Brand: GUARDIAN

## (undated) DEVICE — SYRINGE 10ML SLIP TIP

## (undated) DEVICE — SINGLE USE ASPIRATION NEEDLE: Brand: SINGLE USE ASPIRATION NEEDLE

## (undated) DEVICE — 60 ML SYRINGE REGULAR TIP: Brand: MONOJECT

## (undated) DEVICE — OCCLUSIVE GAUZE STRIP OVERWRAP,3% BISMUTH TRIBROMOPHENATE IN PETROLATUM BLEND: Brand: XEROFORM

## (undated) DEVICE — STERILE POLYISOPRENE POWDER-FREE SURGICAL GLOVES: Brand: PROTEXIS

## (undated) DEVICE — SUTURE VICRYL 2-0 CP-1

## (undated) DEVICE — SOL  .9 1000ML BTL

## (undated) DEVICE — DRAPE C-ARM UNIVERSAL

## (undated) DEVICE — MEDI-VAC SUCTION HANDLE REGULAR CAPACITY: Brand: CARDINAL HEALTH

## (undated) DEVICE — PADDING CAST SOFT ROLL 4\"

## (undated) DEVICE — 3M™ IOBAN™ 2 ANTIMICROBIAL INCISE DRAPE 6648EZ: Brand: IOBAN™ 2

## (undated) DEVICE — SHEET,DRAPE,70X100,STERILE: Brand: MEDLINE

## (undated) NOTE — IP AVS SNAPSHOT
[facility] [place]  [address] [place], [address] ~ [phone]                Discharge Summary   2/3/2017    Adelita Cea           Admission Information        Department    2/3/2017  3sw-A         Thank you for choosing [facility] CONTINUE taking these medications        Instructions Authorizing Provider    Morning Afternoon Evening As Needed    CALCIUM-VITAMIN D OR   Next dose due:  2/10/17 am          Take 1 tablet by mouth daily.                             Clobetasol Pr - rivaroxaban 10 MG Tabs      Please  your prescriptions at the location directed by your doctor or nurse     Bring a paper prescription for each of these medications    - HYDROcodone-acetaminophen 5-325 MG Tabs  - predniSONE 10 MG Tabs Incision care/Dressing changes  ? Wash hands before and after dressing changes. FOR MEDIPORE/COVERLET DRESSINGS:  Change dressing daily using Medipore/coverlet once Aquacel (waterproof) dressing is removed (which is about 7 days after surgery).  Patient ? You may need pain medication regularly (every 4-6 hours) the first 2 weeks and then begin to decrease how often you are taking it. ? Take pain medication as prescribed with food, especially before therapy, allowing 30-60 minutes to take effect.   ? Do no ? Your surgeon may recommend that you take antibiotics before you undergo any dental or other invasive surgical procedures after your joint replacement. Speak with your physician about this at your post-op office visit.   ? Eat a balanced diet high in fiber ? Pain, excessive tenderness, redness, or swelling in leg or calf (other than incision site). Go directly to the ER or CALL 911 if  you:  ? become short of breath  ? have chest pain  ?  cough up blood  ? have unexplained anxiety with breathing Follow up with Dell Stone DO. Schedule an appointment as soon as possible for a visit in 1 week.     Specialties:  Family Practice, IP Consult to Primary Care    Why:  PCPkatherine 1 week after discharge from rehab    Contact information:    71977 0.1 -- (02/05/17)  6.77 (H) (02/05/17)  0.36 (L) (02/05/17)  0.28 (02/05/17)  0.00 (02/05/17)  0.01    (02/04/17)  82.7 (02/04/17)  7.5 (02/04/17)  8.8 (02/04/17)  0.1 (02/04/17)  0.5  (02/04/17)  11.36 (H) (02/04/17)  1.03 (02/04/17)  1.21 (H) (02/04/17) Medication Side Effects - Medications to be taken at home  As your caregivers, we want you to be aware of the medications you are prescribed to take and their potential SIDE EFFECTS.  Your nurse will review your medications with you before you are discharge infection of the mouth/tongue   What to report to your healthcare provider: Head or mouth pain, coating on mouth/tongue, shortness of breath, sensation of heart racing, rash, or itching           Steroids     predniSONE 10 MG Oral Tab         Use:  To treat

## (undated) NOTE — LETTER
[facility]  [address] [address] [facility], [address] [place] [address]    Consent for Operation    Date: __________________    Time: _______________    1.  I authorize the performance upon Quan Mendieta the following operation:    Procedure(s):  RIGHT HIP INTRAMED videotape. [facility] will not be responsible for storage or maintenance of this tape. 6. For the purpose of advancing medical education, I consent to the admittance of observers to the Operating Room.     7. I authorize the use of any specimen, organs Signature of Patient:   ___________________________    When the patient is a minor or mentally incompetent to give consent:  Signature of person authorized to consent for patient: ___________________________   Relationship to patient: _____________________ drugs/illegal medications). Failure to inform my anesthesiologist about these medicines may increase my risk of anesthetic complications. · If I am allergic to anything or have had a reaction to anesthesia before.     3. I understand how the anesthesia med I have discussed the procedure and information above with the patient (or patient’s representative) and answered their questions. The patient or their representative has agreed to have anesthesia services.     _______________________________________________

## (undated) NOTE — Clinical Note
FYI, TCM call made, see notes. NCM attempted to schedule a TCM HFU appointment and patient states she will call herself to schedule because she has to see who will be able to drive her to her appointment. Message sent to MD's office.

## (undated) NOTE — Clinical Note
Can home health care do a UA and urine culture on her to make sure the UTI has resolved we did not get this in the office visits.

## (undated) NOTE — IP AVS SNAPSHOT
[address]            (For Outpatient Use Only) Initial Admit Date: 10/1/2018   Inpt/Obs Admit Date: Inpt: 10/1/18 / Obs: N/A   Discharge Date:    Skip Sarah:  [de-identified]   MRN: [de-identified]   CSN: [id number]        ENCOUNTER  Patient Subscriber ID:  Pt Rel to Subscriber:    Hospital Account Financial Class: Medicare    October 4, 2018

## (undated) NOTE — Clinical Note
Give her the information for the infusion center he had ordered a Reclast infusion for her for the osteoporosis.   Thanks

## (undated) NOTE — ED AVS SNAPSHOT
Cisco Pickett   MRN: [id number]    Department:  [facility] Emergency Department   Date of Visit:  1/11/2019           Disclosure     Insurance plans vary and the physician(s) referred by the ER may not be covered by your plan.  Please contact y tell this physician (or your personal doctor if your instructions are to return to your personal doctor) about any new or lasting problems. The primary care or specialist physician will see patients referred from the [facility] Emergency Department.  Morris Carney

## (undated) NOTE — LETTER
09/01/17        [address]  [address] [facility] [address]      Dear Regina Ken,    [address] records indicate that you have outstanding lab work and or testing that was ordered for you and has not yet been completed:       urine Microalb/Creat Rati

## (undated) NOTE — IP AVS SNAPSHOT
Patient Demographics     Address  [address] [place] [address] Phone  [phone] (Home) *Preferred*  [phone] [facility])      Emergency Contact(s)     Name Relation Home Work Mobile    Bebe Shoemaker Daughter   [phone]    Elfredia Knock Daughter aspirin 81 MG Tbec  Next dose due:  10/5/18 - 9 am      Take 81 mg by mouth daily. Calcium Carbonate-Vitamin D 600-400 MG-UNIT Tabs  Next dose due:  10/5/18 - 9 am      Take 1 tablet by mouth daily.           dexamethasone 2 MG Tabs  Commonly known [id number] ipratropium-albuterol (DUONEB) nebulizer solution 3 mL 10/04/18 1159 Given      [id number] losartan (COZAAR) tab 100 mg 10/04/18 0800 Given              Recent Vital Signs       Most Recent Value   Vitals  137/53 Filed at 10/04/2018 1318   Pulse GFR, -American 84 >=60 — [facility]   Comment:           Estimated GFR units: mL/min/1.73 square meters   eGFR calculated by the CKD-EPI equation.             Testing Performed By     Nona Frye Name Director Address Valid Date Range    139 - Denies numbness or focal weakness. Denies speech change or difficulty swallowing. No N/V/D. No other complaints at this time.      Past Medical History:  Past Medical History:   Diagnosis Date   • COPD (chronic obstructive pulmonary disease) ([facility] [address] [facility]     O2 @ Allergies:   Daliresp [Roflumila*    PAIN  Scopolamine                 Comment:HBr SOLN             Altered mental state    Medications:    No current facility-administered medications on file prior to encounter.    Current Outpatient Medications on File Integument: No rashes or lesions. Psychiatric: Appropriate mood and affect.       Diagnostic Data:      Labs:  Recent Labs   Lab  10/01/18   0829   WBC  4.6   HGB  12.8   MCV  87.1   PLT  215.0       Recent Labs   Lab  10/01/18   0829   GLU  93   BUN  10 Location [address] [place] [address] Attending Fareed Morataya MD   [facility] Day # 0 PCP Tavon Escobar, DO     Chief Complaint: Headache    History of Present Illness: Quan Mendieta is a 80year old female with a PMH of NSCLC s/p palliative radi been smoking about 0.50 packs per day. she has never used smokeless tobacco. She reports that she drinks about 8.4 oz of alcohol per week. She reports that she does not use drugs.     Family History:   Family History   Problem Relation Age of Onset   • Otkellee Respiratory: Clear to auscultation bilaterally. No wheezes. No rhonchi. Cardiovascular: S1, S2. Regular rate and rhythm. No murmurs, rubs or gallops. Chest and Back: No tenderness or deformity. Abdomen: Soft, nontender, nondistended.   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  Disp:  Rfl:  9/30/2018 at Unknown time   Albuterol Sulfate  (90 Base) MCG/ACT Inhalation Aero Soln Inhale 2 puffs into the lungs every 4 (four) hours as needed GENERAL:  Patient is a[MO.3 80year old[MO.2] female in no acute distress. HEENT:  Normocephalic, atraumatic. NEUROLOGICAL:  This patient is alert and orientated x 3. Speech fluent. Comprehension intact.    PERRLA +3 brisk,  EOMI[MO.1], no nystagmus pre No sign of acute sinusitis or mastoiditis.   No skull fracture.     =====  CONCLUSION:  Left occipital lobe mass with surrounding vasogenic edema is most concerning for metastatic lesion given the findings on recent CT chest.  Other differential considerati Room Number: 9818/8639-M  Evaluation Date: 10/3/2018  Type of Evaluation:[EP.1] Initial[EP.2]  Physician Order: PT Eval and Treat    Presenting Problem: HA, brain mass  Reason for Therapy: Mobility Dysfunction and Discharge Planning    History related to c • Other malignant lymphomas, unspecified site, extranodal and solid organ sites [id number]   • Personal history of antineoplastic chemotherapy [phone]   • Pulmonary nodule, upper lobe-left 07/12/2017    16mm PRANEETH spiculated and new from 2015;  Jean Claude Marin Lower extremity ROM is within functional limits     Lower extremity strength is within functional limits     BALANCE  Static Sitting: Fair -  Dynamic Sitting: Poor +  Static Standing: Poor +  Dynamic Standing: Poor +    ADDITIONAL TESTS positioning.,activity. [EP.5]       Exercise/Education Provided:[EP.1]  Bed mobility  Functional activity tolerated  Gait training  Neuromuscular re-educate  Posture  Transfer training[EP.5]    Patient End of Session: Up in chair;Needs met;Call light within Goal #1 Patient is able to demonstrate supine - sit EOB @ level:[EP.1] supervision[EP.5]     Goal #2 Patient is able to demonstrate transfers[EP.1] Sit to/from Stand[EP.5] at assistance level:[EP.1] supervision[EP.5]     Goal #3 Patient is able to ambulate for a solitary metastasis. Other differential considerations would include a high-grade astrocytoma or   lymphoma. Neurosurgical consultation is recommended.   2. Mild to moderate chronic small vessel ischemic disease with a punctate chronic infarct withi 08/2013: OTHER SURGICAL HISTORY      Comment:  lymphoma in the groin treated with Radiation  01/16/2018: OTHER SURGICAL HISTORY      Comment:  Macy Toro  Type of Home: House  Home Layout: Two level; Other (Comm Upper extremity ROM is within functional limits     Upper extremity strength is within functional limits     COORDINATION  Gross Motor    [facility]    Fine Motor    [facility]      ADDITIONAL TESTS                                    NEUROLOGICAL FINDINGS mass[MS.3]. Complete medical history and occupational profile noted above. Functional outcome measures completed include[MS. 1] AM-PAC, ROM[MS.3].  In this OT evaluation patient presents with the following performance deficits:[MS.1] impaired attention, decr education;Patient/Family training;Equipment eval/education; Compensatory technique education  Rehab Potential : Fair  Frequency (Obs): 5x/week  Number of Visits to Meet Established Goals: 5    ADL Goals[MS.1]   Patient will perform grooming: with supervisio

## (undated) NOTE — LETTER
[facility]  Lanre Z[address]ala [address] [facility], [address] [place] [address]    Consent for Operation    Date: __________________    Time: _______________    1.  I authorize the performance upon Elodia Farooq the following operation:    Procedure(s):  RIGHT HIP INTRAMED videotape. [facility] will not be responsible for storage or maintenance of this tape. 6. For the purpose of advancing medical education, I consent to the admittance of observers to the Operating Room.     7. I authorize the use of any specimen, organs Signature of Patient:   ___________________________    When the patient is a minor or mentally incompetent to give consent:  Signature of person authorized to consent for patient: ___________________________   Relationship to patient: _____________________ drugs/illegal medications). Failure to inform my anesthesiologist about these medicines may increase my risk of anesthetic complications. · If I am allergic to anything or have had a reaction to anesthesia before.     3. I understand how the anesthesia med I have discussed the procedure and information above with the patient (or patient’s representative) and answered their questions. The patient or their representative has agreed to have anesthesia services.     _______________________________________________

## (undated) NOTE — IP AVS SNAPSHOT
Patient Demographics     Address Phone    [address]      Emergency Contact(s)     Name Relation Home Work Mobile    Bebe Shoemaker Daughter   [phone]    Jaclyn Hein Daughter   [phone]    Jayda Speaker ? Usually allowed after four to six weeks – check with surgeon at your office visit. Return to work  ? Usually allowed after four to six weeks. Discuss specific work activities with your surgeon. Restrictions  ?  For hip replacement surgery, fol blood in your urine. Use electric razors and soft toothbrushes only. ? Do not take aspirin while taking blood thinners unless ordered by your physician. ? Review anticoagulant education information sheet provided. Discomfort  ?  Surgical discomfort is ? An enema or suppository may be needed if above measures do not work. Prevention of infection and promotion of healing  ? Good hand washing is important.  Everyone should wash their hands or use hand  as soon as they walk [facility] ? Increased or foul smelling drainage from incision  ? Red streaks on skin near incision. ? Temperature >100.4F.  ? Increased pain at incision not relieved by pain medication. Signs of Possible Dislocation  ? Increased severe leg or groin pain  ?  Felix Donovan space to open car doors to position yourself properly with walker to get in and out of your car safely; some parking spaces are  practically on top of each other and do not give you enough room.        SPECIAL  INSTRUCTIONS:                      Follow-up I Take 1 tablet by mouth every 4 (four) hours as needed.     Francis Ovalles                           losartan 100 MG Tabs   Last time this was given:  100 mg on 2/9/2017 10:53 AM   Commonly known as:  COZAAR   Next dose due:  2/10/17 am          TAKE 1 TABLET or nurse     Bring a paper prescription for each of these medications    - HYDROcodone-acetaminophen 5-325 MG Tabs  - predniSONE 10 MG Tabs            [id number] - MAR ACTION REPORT  (last 24 hrs)    ** SITE UNKNOWN **     Order ID Medication Name Action Ti Vitals 118/56 mmHg Filed at 02/09/2017 0816    Pulse 68 Filed at 02/09/2017 1228    Resp 18 Filed at 02/09/2017 1228    Temp 98 °F (36.7 °C) Filed at 02/09/2017 1228    SpO2 94 % Filed at 02/09/2017 1228      Patient's Most Recent Weight       Most Recent chest pain or shortness of breath. Denies any headache. Denies any nausea vomiting. Denies any diarrhea. Denies any constipation. Denies any focal weakness or numbness.       History:  Past Medical History   Diagnosis Date   • Other malignant lymphomas Clobetasol Propionate 0.05 % External Shampoo apply onto dry (not wet) scalp once a day in a thin film to the affected areas only, and left in place for 15 minutes before lathering and rinsing as needed Disp: 118 mL Rfl: 1 Taking   DULERA 100-5 MCG/ACT Inh BILT 0.3 02/03/2017   TP 6.6 02/03/2017   AST 27 02/03/2017   ALT 24 02/03/2017   INR 0.87 02/03/2017   PTP 12.1 02/03/2017   TROP <0.046 02/03/2017       Recent Labs   Lab  02/03/17   2344   PTP  12.1*   INR  0.87*       Recent Labs   Lab  02/03/17   2344 2. Osteoarthritis bilateral hips  3.  COPD with chronic hypoxic respiratory failure on home oxygen 2 L via nasal cannula– current COPD exacerbation with some bilateral wheezing and more oxygen requirement–pulmonary consult with Dr. Kris Yang for pre-op clearanc Raghavendra Sidhu is a a(n) 80year old female. Ongoing smoker of probably a half pack of cigarettes per day with a known diagnosis of severe COPD. She is O2 dependent at night.   She also has a history of macular degeneration now legally blind and a hist Prescriptions prior to admission:  Anjali Argueta 18 MCG Inhalation Cap INHALE CONTENTS OF 1 CAPSULE VIA HANDIHALER ONCE EVERY DAY Disp: 30 capsule Rfl: 2 2/3/2017 at Unknown time   Risedronate Sodium (ACTONEL) 35 MG Oral Tab Take 1 tablet (35 mg total BP Temp Temp src Pulse Resp SpO2 Height Weight   02/04/17 1135 116/36 mmHg 98.9 °F (37.2 °C) Oral 84 16 96 % - -   02/04/17 0757 106/45 mmHg - - - - - - -   02/04/17 0749 (!) 83/55 mmHg 98.6 °F (37 °C) Oral 89 18 93 % - -   02/04/17 0418 145/53 mmHg (!) 9 Chest x-ray with COPD changes no focal infiltrate-questionable right upper lobe nodule seems unchanged from montage on CTA    Assessment and Plan:  Patient Active Problem List:     COPD (chronic obstructive pulmonary disease) ([facility] [address]     Legally blind     DM Intertrochanteric fracture of left hip, closed, initial encounter  Active Problems:    COPD (chronic obstructive pulmonary disease) ([facility]    Essential hypertension    Intertrochanteric fracture of left hip (HCC)    Hypoxia    Hyponatremia      Past Medic -   Moving from lying on back to sitting on the side of the bed?: A Little   How much help from another person does the patient currently need. ..   -   Moving to and from a bed to a chair (including a wheelchair)?: A Little   -   Need to walk in hospital r achieve highest functional independence/return to baseline. Recommend NAMAN upon EH d/c.      DISCHARGE RECOMMENDATIONS  PT Discharge Recommendations: Sub-acute rehabilitation (ELOS: 20-22 days)     PLAN  PT Treatment Plan: Bed mobility; Endurance; Energy cons • Other malignant lymphomas, unspecified site, extranodal and solid organ sites    • Osteoporosis, unspecified    • COPD (chronic obstructive pulmonary disease) (HCC)    • Exudative senile macular degeneration of retina      Left   • Other forms of retinal Gait Assessment   Gait Assistance: Maximum assistance (per FIM distance - otherwise Min A)  Distance (ft): 100  Assistive Device: Rolling walker  Pattern: Shuffle (cues for pursed lip breathing)  Stoop/Curb Assistance: Not tested  Comment : above scores ba PT Treatment Plan: Bed mobility; Endurance; Energy conservation;Patient education; Family education;Gait training;Strengthening;Transfer training;Balance training    CURRENT GOALS    Goal #1  Patient is able to demonstrate supine - sit EOB @ level: supervisio 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 Pt performed supine<>sit transfer with c Min assist, and VC's for BUE placement/support. Pt performed seated EOB exercises, and active flexibility for knee/hip musculature.  Pt ambula Goal #3  Patient is able to ambulate 50 feet with assist device: walker - rolling at assistance level: minimum assistance - met 2/7/2017     Upgrade to 150ft at Min A with O2 sats >92% on appropriate titration TBD by RN      Goal #4      Goal #5      Goal COPD (chronic obstructive pulmonary disease) (HCC)    Essential hypertension    Intertrochanteric fracture of left hip (HCC)    Hypoxia    Hyponatremia      Past Medical History  Past Medical History   Diagnosis Date   • Other malignant lymphomas, unspec Sit to Stand: Minimum assistance (CGA)    Skilled Therapy Provided: Pt is motivated to work with OT. Pt's gown, brief, and linens on the chair were all wet. CGA to ambulate to bathroom, CGA stand to sit onto the toilet seat using the grab bar.   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   Pt is legally blind, but left the handouts in the room and educated the patient to have her family rev Patient will be modified independent with bilateral AROM HEP (home exercise program). -ongoing                                 Video Swallow Study Notes     No notes of this type exist for this encounter.       SLP Notes     No notes of this type exist for t